# Patient Record
Sex: MALE | Race: WHITE | NOT HISPANIC OR LATINO | Employment: FULL TIME | URBAN - METROPOLITAN AREA
[De-identification: names, ages, dates, MRNs, and addresses within clinical notes are randomized per-mention and may not be internally consistent; named-entity substitution may affect disease eponyms.]

---

## 2017-10-10 ENCOUNTER — HOSPITAL ENCOUNTER (OUTPATIENT)
Dept: LAB | Facility: MEDICAL CENTER | Age: 62
End: 2017-10-10
Attending: UROLOGY
Payer: COMMERCIAL

## 2017-10-10 LAB — PSA SERPL-MCNC: 4.85 NG/ML (ref 0–4)

## 2017-10-10 PROCEDURE — 84153 ASSAY OF PSA TOTAL: CPT

## 2017-10-10 PROCEDURE — 36415 COLL VENOUS BLD VENIPUNCTURE: CPT

## 2018-10-15 ENCOUNTER — HOSPITAL ENCOUNTER (OUTPATIENT)
Dept: LAB | Facility: MEDICAL CENTER | Age: 63
End: 2018-10-15
Attending: UROLOGY
Payer: COMMERCIAL

## 2018-10-15 PROCEDURE — 36415 COLL VENOUS BLD VENIPUNCTURE: CPT

## 2018-10-15 PROCEDURE — 84153 ASSAY OF PSA TOTAL: CPT

## 2018-10-16 LAB — PSA SERPL-MCNC: 5.48 NG/ML (ref 0–4)

## 2018-11-12 ENCOUNTER — OFFICE VISIT (OUTPATIENT)
Dept: MEDICAL GROUP | Facility: MEDICAL CENTER | Age: 63
End: 2018-11-12
Payer: COMMERCIAL

## 2018-11-12 VITALS
TEMPERATURE: 96.1 F | WEIGHT: 173.72 LBS | HEART RATE: 67 BPM | OXYGEN SATURATION: 97 % | DIASTOLIC BLOOD PRESSURE: 68 MMHG | SYSTOLIC BLOOD PRESSURE: 102 MMHG | BODY MASS INDEX: 24.87 KG/M2 | HEIGHT: 70 IN

## 2018-11-12 DIAGNOSIS — M25.532 LEFT WRIST PAIN: ICD-10-CM

## 2018-11-12 DIAGNOSIS — E78.5 DYSLIPIDEMIA: ICD-10-CM

## 2018-11-12 DIAGNOSIS — M25.512 ACUTE PAIN OF LEFT SHOULDER: ICD-10-CM

## 2018-11-12 DIAGNOSIS — Z23 NEED FOR VACCINATION: ICD-10-CM

## 2018-11-12 DIAGNOSIS — L57.0 AK (ACTINIC KERATOSIS): ICD-10-CM

## 2018-11-12 PROCEDURE — 90715 TDAP VACCINE 7 YRS/> IM: CPT | Performed by: INTERNAL MEDICINE

## 2018-11-12 PROCEDURE — 90471 IMMUNIZATION ADMIN: CPT | Performed by: INTERNAL MEDICINE

## 2018-11-12 PROCEDURE — 99214 OFFICE O/P EST MOD 30 MIN: CPT | Mod: 25 | Performed by: INTERNAL MEDICINE

## 2018-11-12 PROCEDURE — 90472 IMMUNIZATION ADMIN EACH ADD: CPT | Performed by: INTERNAL MEDICINE

## 2018-11-12 PROCEDURE — 17000 DESTRUCT PREMALG LESION: CPT | Performed by: INTERNAL MEDICINE

## 2018-11-12 PROCEDURE — 90686 IIV4 VACC NO PRSV 0.5 ML IM: CPT | Performed by: INTERNAL MEDICINE

## 2018-11-12 RX ORDER — IBUPROFEN 600 MG/1
600 TABLET ORAL EVERY 8 HOURS PRN
Qty: 30 TAB | Refills: 0 | Status: SHIPPED | OUTPATIENT
Start: 2018-11-12 | End: 2018-12-12

## 2018-11-12 ASSESSMENT — PATIENT HEALTH QUESTIONNAIRE - PHQ9
SUM OF ALL RESPONSES TO PHQ QUESTIONS 1-9: 7
CLINICAL INTERPRETATION OF PHQ2 SCORE: 1
5. POOR APPETITE OR OVEREATING: 0 - NOT AT ALL

## 2018-11-12 NOTE — PROGRESS NOTES
"CC: Joint complaints, dyslipidemia, skin lesion.    HPI:   Felipe presents today with the following.    1. Acute pain of left shoulder  Presents complaining of left shoulder pain present for approximately 1 week.  Began to to be painful after beginning to lift weights.  Pain is 8 out of 10 intensity at rest.  He is taken 1 dose of ibuprofen with only minimally helpful.  Pain is with elevation.  He has rested for a week but it came back.  He is doing straight arm raises with weights.    2. Left wrist pain  Is also complaining of left wrist pain on the dorsum of his left hand.  3 out of 10 in intensity.  Worse with  strength and he has been trying to exercise more to help it.  No deformity no swelling.    3. Dyslipidemia  History of dyslipidemia well overdue for recheck of cholesterol is been almost 3 years.    4. AK (actinic keratosis)  Skin lesion on his right dorsum of his hand been present for several months it does continually scab which he picks off.    5. Need for vaccination        Patient Active Problem List    Diagnosis Date Noted   • Dyslipidemia 03/16/2010   • BPH (benign prostatic hypertrophy) 06/10/2009       Current Outpatient Prescriptions   Medication Sig Dispense Refill   • ibuprofen (MOTRIN) 600 MG Tab Take 1 Tab by mouth every 8 hours as needed. 30 Tab 0     No current facility-administered medications for this visit.          Allergies as of 11/12/2018 - Reviewed 11/12/2018   Allergen Reaction Noted   • Nkda [no known drug allergy]  08/10/2011        ROS: Denies Chest pain, SOB, LE edema.    /68   Pulse 67   Temp (!) 35.6 °C (96.1 °F)   Ht 1.778 m (5' 10\")   Wt 78.8 kg (173 lb 11.6 oz)   SpO2 97%   BMI 24.93 kg/m²     Physical Exam:  Gen:         Alert and oriented, No apparent distress.  Neck:        No Lymphadenopathy or Bruits.  Lungs:     Clear to auscultation bilaterally  CV:          Regular rate and rhythm. No murmurs, rubs or gallops.               Ext:          No " clubbing, cyanosis, edema.      Assessment and Plan.   63 y.o. male with the following issues.    1. Acute pain of left shoulder  Shoulder exam fairly benign with some mild pain with internal rotation.  Recommending rest ice given an anti-inflammatory and if not improving physical therapy.  - REFERRAL TO PHYSICAL THERAPY Reason for Therapy: Eval/Treat/Report    2. Left wrist pain  Again rest ice and anti-inflammatory if not improving orthopedics.  - ibuprofen (MOTRIN) 600 MG Tab; Take 1 Tab by mouth every 8 hours as needed.  Dispense: 30 Tab; Refill: 0    3. Dyslipidemia  Recheck cholesterol  - COMP METABOLIC PANEL; Future  - Lipid Profile; Future    4. AK (actinic keratosis)  Lesion on the dorsum of her right hand consistent with actinic keratosis frozen with liquid nitrogen today.  Post care instructions given    5. Need for vaccination    - Influenza Vaccine Quad Injection >3Y (PF)  - TDAP VACCINE =>8YO IM

## 2018-11-21 ENCOUNTER — HOSPITAL ENCOUNTER (OUTPATIENT)
Dept: LAB | Facility: MEDICAL CENTER | Age: 63
End: 2018-11-21
Attending: INTERNAL MEDICINE
Payer: COMMERCIAL

## 2018-11-21 DIAGNOSIS — E78.5 DYSLIPIDEMIA: ICD-10-CM

## 2018-11-21 LAB
ALBUMIN SERPL BCP-MCNC: 4.4 G/DL (ref 3.2–4.9)
ALBUMIN/GLOB SERPL: 1.4 G/DL
ALP SERPL-CCNC: 43 U/L (ref 30–99)
ALT SERPL-CCNC: 32 U/L (ref 2–50)
ANION GAP SERPL CALC-SCNC: 7 MMOL/L (ref 0–11.9)
AST SERPL-CCNC: 21 U/L (ref 12–45)
BILIRUB SERPL-MCNC: 0.6 MG/DL (ref 0.1–1.5)
BUN SERPL-MCNC: 27 MG/DL (ref 8–22)
CALCIUM SERPL-MCNC: 9.8 MG/DL (ref 8.5–10.5)
CHLORIDE SERPL-SCNC: 105 MMOL/L (ref 96–112)
CHOLEST SERPL-MCNC: 219 MG/DL (ref 100–199)
CO2 SERPL-SCNC: 28 MMOL/L (ref 20–33)
CREAT SERPL-MCNC: 0.93 MG/DL (ref 0.5–1.4)
FASTING STATUS PATIENT QL REPORTED: NORMAL
GLOBULIN SER CALC-MCNC: 3.1 G/DL (ref 1.9–3.5)
GLUCOSE SERPL-MCNC: 90 MG/DL (ref 65–99)
HDLC SERPL-MCNC: 37 MG/DL
LDLC SERPL CALC-MCNC: 130 MG/DL
POTASSIUM SERPL-SCNC: 4.5 MMOL/L (ref 3.6–5.5)
PROT SERPL-MCNC: 7.5 G/DL (ref 6–8.2)
SODIUM SERPL-SCNC: 140 MMOL/L (ref 135–145)
TRIGL SERPL-MCNC: 262 MG/DL (ref 0–149)

## 2018-11-21 PROCEDURE — 36415 COLL VENOUS BLD VENIPUNCTURE: CPT

## 2018-11-21 PROCEDURE — 80061 LIPID PANEL: CPT

## 2018-11-21 PROCEDURE — 80053 COMPREHEN METABOLIC PANEL: CPT

## 2018-12-12 ENCOUNTER — OFFICE VISIT (OUTPATIENT)
Dept: MEDICAL GROUP | Facility: MEDICAL CENTER | Age: 63
End: 2018-12-12
Payer: COMMERCIAL

## 2018-12-12 VITALS
HEART RATE: 95 BPM | HEIGHT: 70 IN | DIASTOLIC BLOOD PRESSURE: 64 MMHG | TEMPERATURE: 98 F | SYSTOLIC BLOOD PRESSURE: 110 MMHG | WEIGHT: 177 LBS | BODY MASS INDEX: 25.34 KG/M2 | OXYGEN SATURATION: 96 %

## 2018-12-12 DIAGNOSIS — R05.9 COUGH: ICD-10-CM

## 2018-12-12 DIAGNOSIS — E78.5 DYSLIPIDEMIA: ICD-10-CM

## 2018-12-12 PROCEDURE — 99214 OFFICE O/P EST MOD 30 MIN: CPT | Performed by: INTERNAL MEDICINE

## 2018-12-12 RX ORDER — ATORVASTATIN CALCIUM 20 MG/1
20 TABLET, FILM COATED ORAL DAILY
Qty: 90 TAB | Refills: 3 | Status: SHIPPED | OUTPATIENT
Start: 2018-12-12 | End: 2020-03-12

## 2018-12-12 RX ORDER — ALBUTEROL SULFATE 90 UG/1
2 AEROSOL, METERED RESPIRATORY (INHALATION) EVERY 6 HOURS PRN
Qty: 8.5 G | Refills: 6 | Status: SHIPPED | OUTPATIENT
Start: 2018-12-12 | End: 2022-03-09

## 2018-12-13 NOTE — PROGRESS NOTES
"CC: Follow-up cholesterol, cough.    HPI:   Felipe presents today with the following.    1. Dyslipidemia  Presents after having blood work showing cholesterol significantly elevated with a low HDL.  He does have a significant family history of heart disease and calculated to have 11% risk of heart attack in the next 10 years.  He is amenable to trying medication already exercises and follows a good diet.    2. Cough  He is complaining of persistent cough for 2 months slightly intermittently productive of yellow sputum.  No fevers or chills no other infectious symptoms.  Discussing he reports he had asthma as a child.  Denies any current shortness of breath      Patient Active Problem List    Diagnosis Date Noted   • Dyslipidemia 03/16/2010   • BPH (benign prostatic hypertrophy) 06/10/2009       Current Outpatient Prescriptions   Medication Sig Dispense Refill   • atorvastatin (LIPITOR) 20 MG Tab Take 1 Tab by mouth every day. 90 Tab 3   • albuterol 108 (90 Base) MCG/ACT Aero Soln inhalation aerosol Inhale 2 Puffs by mouth every 6 hours as needed for Shortness of Breath. 8.5 g 6     No current facility-administered medications for this visit.          Allergies as of 12/12/2018 - Reviewed 12/12/2018   Allergen Reaction Noted   • Nkda [no known drug allergy]  08/10/2011        ROS: Denies Chest pain, SOB, LE edema.    /64 (BP Location: Left arm, Patient Position: Sitting)   Pulse 95   Temp 36.7 °C (98 °F)   Ht 1.778 m (5' 10\")   Wt 80.3 kg (177 lb)   SpO2 96%   BMI 25.40 kg/m²     Physical Exam:  Gen:         Alert and oriented, No apparent distress.  Neck:        No Lymphadenopathy or Bruits.  Lungs:     Clear to auscultation bilaterally  CV:          Regular rate and rhythm. No murmurs, rubs or gallops.               Ext:          No clubbing, cyanosis, edema.      Assessment and Plan.   63 y.o. male with the following issues.    1. Dyslipidemia  Discussion about risk he is willing to try statin recheck " blood work 2 months.  - COMP METABOLIC PANEL; Future  - Lipid Profile; Future  - atorvastatin (LIPITOR) 20 MG Tab; Take 1 Tab by mouth every day.  Dispense: 90 Tab; Refill: 3    2. Cough  Have given an albuterol inhaler to see if he helps discussed pulmonary function test which he declines.

## 2019-01-10 DIAGNOSIS — M25.532 LEFT WRIST PAIN: ICD-10-CM

## 2019-01-10 RX ORDER — IBUPROFEN 600 MG/1
TABLET ORAL
Qty: 30 TAB | Refills: 0 | Status: SHIPPED | OUTPATIENT
Start: 2019-01-10 | End: 2019-01-15 | Stop reason: SDUPTHER

## 2019-01-11 ENCOUNTER — OFFICE VISIT (OUTPATIENT)
Dept: MEDICAL GROUP | Facility: MEDICAL CENTER | Age: 64
End: 2019-01-11
Payer: COMMERCIAL

## 2019-01-11 VITALS
HEIGHT: 70 IN | OXYGEN SATURATION: 95 % | DIASTOLIC BLOOD PRESSURE: 78 MMHG | SYSTOLIC BLOOD PRESSURE: 120 MMHG | BODY MASS INDEX: 25.34 KG/M2 | TEMPERATURE: 98.4 F | HEART RATE: 77 BPM | WEIGHT: 177 LBS

## 2019-01-11 DIAGNOSIS — M25.512 CHRONIC LEFT SHOULDER PAIN: ICD-10-CM

## 2019-01-11 DIAGNOSIS — G89.29 CHRONIC LEFT SHOULDER PAIN: ICD-10-CM

## 2019-01-11 PROCEDURE — 99213 OFFICE O/P EST LOW 20 MIN: CPT | Performed by: INTERNAL MEDICINE

## 2019-01-11 NOTE — LETTER
January 11, 2019        Felipe Hutson        Above would benefit from  animal.                       Bishop Pham

## 2019-01-11 NOTE — LETTER
January 11, 2019     Felipe Hutson  [address] [place] [address]      Dear Felipe:    Thank you for enrolling in [employer]. Please follow the instructions below to securely access your online medical record. [employer] allows you to send messages to your healthcare team, view certain test results, renew your prescriptions, schedule appointments, and more.     How Do I Sign Up?  1. In your Internet browser, go to  [email or web address]  2. Click on the Enter Code link in the Sign In box. You will see the New Member Sign Up page.  3. Enter your [facility] Access Code exactly as it appears below (case sensitive). You will not need to use this code after you’ve completed the sign-up process. If you do not sign up before the expiration date, you must request a new code.  [facility] Access Code: [email or web address]  Expires: 1/28/2019  5:13 AM    4. Enter your Email address and Date of Birth (mm/dd/yyyy) as indicated and click Submit. You will be taken to the next sign-up page.  5. Create a [facility] ID (case sensitive) . This will be your [facility] [employer] login ID and cannot be changed, so think of one that is secure and easy to remember.  6. Create a [facility] [employer] password  (case sensitive).   · Your password must be a length of at least 6 characters/digits.  · It must include at least 1 numeric.  · You can change your password at any time.  7. Enter your Password Reset Question and Answer. This can be used at a later time if you forget your password.   8. Enter your e-mail address. You will receive e-mail notification when new information is available in [employer].  9. Click Sign Up. You can now view your medical record.     Additional Information  Please contact [employer] Customer Support at [phone] for any questions . Remember, [employer] is NOT to be used for urgent needs. For medical emergencies, dial 911.          Introducing [employer]    [facility]’s Secure, Online Health Connection      [facility] now offers  convenient, online access to your healthcare team and personal health information.  [employer] makes managing your healthcare easier than ever, and allows you to:  • Email your healthcare provider securely and privately  • Access your test results  • Request prescription refills 24 hours a day  • View your personal medical records from home  • Schedule or change your appointments  • View your Insurance and Billing Information  • Pay bills online ? Coming soon!        Sign below to get started:  I hereby request access to [employer] application.  I agree to abide by the [employer] Terms and Conditions, which will be provided to me upon activating my account.       __________________________________        _________________________  Name (Please Print)          Date of Birth     __________________________________       _________________________  Signature          Primary Care Provider      _______________  Date                          *For Internal Use Only: Please scan this form into the patient’s chart. Click on  - Select Patient - Attach to Encounter:  - Document Type: Consent   - Document Description: MyChart Consent

## 2019-01-11 NOTE — PROGRESS NOTES
"CC: Follow-up shoulder pain.    HPI:   Felipe presents today with the following.    1. Chronic left shoulder pain  Presents after being referred to physical therapy did not get a phone call from physical therapist.  Just called the other day and has been now connected and is scheduled for 4 visits.  Still having pain with lifting over his head requesting refills of ibuprofen.  Denies any further injury since last visit.  Pain is 5 out of 10 intensity with raising over his head.  Denies any abdominal complaints from ibuprofen.      Patient Active Problem List    Diagnosis Date Noted   • Dyslipidemia 03/16/2010   • BPH (benign prostatic hypertrophy) 06/10/2009       Current Outpatient Prescriptions   Medication Sig Dispense Refill   •  MG Tab TAKE 1 TABLET BY MOUTH EVERY 8 HOURS AS NEEDED 30 Tab 0   • atorvastatin (LIPITOR) 20 MG Tab Take 1 Tab by mouth every day. 90 Tab 3   • albuterol 108 (90 Base) MCG/ACT Aero Soln inhalation aerosol Inhale 2 Puffs by mouth every 6 hours as needed for Shortness of Breath. 8.5 g 6     No current facility-administered medications for this visit.          Allergies as of 01/11/2019 - Reviewed 01/11/2019   Allergen Reaction Noted   • Nkda [no known drug allergy]  08/10/2011        ROS: Denies Chest pain, SOB, LE edema.    /78 (BP Location: Right arm, Patient Position: Sitting)   Pulse 77   Temp 36.9 °C (98.4 °F)   Ht 1.778 m (5' 10\")   Wt 80.3 kg (177 lb)   SpO2 95%   BMI 25.40 kg/m²     Physical Exam:  Gen:         Alert and oriented, No apparent distress.  Neck:        No Lymphadenopathy or Bruits.  Lungs:     Clear to auscultation bilaterally  CV:          Regular rate and rhythm. No murmurs, rubs or gallops.               Ext:          No clubbing, cyanosis, edema.      Assessment and Plan.   63 y.o. male with the following issues.    1. Chronic left shoulder pain  Continue with plan as discussed previously physical therapy if not improving will get imaging and " refer to sports medicine.

## 2019-01-15 DIAGNOSIS — M25.532 LEFT WRIST PAIN: ICD-10-CM

## 2019-01-15 RX ORDER — IBUPROFEN 600 MG/1
600 TABLET ORAL EVERY 8 HOURS PRN
Qty: 30 TAB | Refills: 0 | Status: SHIPPED | OUTPATIENT
Start: 2019-01-15 | End: 2022-03-09

## 2019-01-21 ENCOUNTER — PHYSICAL THERAPY (OUTPATIENT)
Dept: PHYSICAL THERAPY | Facility: MEDICAL CENTER | Age: 64
End: 2019-01-21
Attending: INTERNAL MEDICINE
Payer: COMMERCIAL

## 2019-01-21 DIAGNOSIS — M25.512 LEFT SHOULDER PAIN, UNSPECIFIED CHRONICITY: ICD-10-CM

## 2019-01-21 PROCEDURE — 97110 THERAPEUTIC EXERCISES: CPT

## 2019-01-21 PROCEDURE — 97161 PT EVAL LOW COMPLEX 20 MIN: CPT

## 2019-01-21 PROCEDURE — 97014 ELECTRIC STIMULATION THERAPY: CPT

## 2019-01-21 SDOH — ECONOMIC STABILITY: GENERAL: QUALITY OF LIFE: EXCELLENT

## 2019-01-21 ASSESSMENT — ENCOUNTER SYMPTOMS
PAIN SCALE AT HIGHEST: 5
ALLEVIATING FACTORS: ACTIVITY
PAIN TIMING: CONTINUOUSLY
PAIN SCALE AT LOWEST: 2
EXACERBATED BY: OVERHEAD ACTIVITY
EXACERBATED BY: CARRYING
PAIN SCALE: 2
QUALITY: SHARP

## 2019-01-21 NOTE — OP THERAPY EVALUATION
Outpatient Physical Therapy  INITIAL EVALUATION    [facility] Outpatient Physical Therapy  [address]  [place] [address]  Phone:  [phone]  Fax:  [phone]    Date of Evaluation: 2019    Patient: Felipe Hutson  YOB: 1955  MRN: [id number]     Referring Provider: No referring provider defined for this encounter.   Referring Diagnosis Pain in left shoulder [M25.512]     Time Calculation  Start time: 1430  Stop time: 1530 Time Calculation (min): 60 minutes     Physical Therapy Occurrence Codes    Date of onset of impairment:  18   Date physical therapy care plan established or reviewed:  19   Date physical therapy treatment started:  19          Chief Complaint: No chief complaint on file.    Visit Diagnoses     ICD-10-CM   1. Left shoulder pain, unspecified chronicity M25.512         Subjective:   History of Present Illness:     Mechanism of injury:  The patient reports onset of L lateral shoulder pain when in the gym 2 months ago following an exercise of  abducting arms with #20 free weights in hands, palms down. The pain began shortly after this activity. He stopped doing his gym program but the pain has worsened since then. Has numbness/tingling on volar side L hand only. Denies neck pain. Denies radiating pain below elbow. Symptoms are currently in L anterior shoulder and in proximal, lateral L arm.   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, at baseline was doing more activities at the gym with [employer]  Headaches:  no headaches  Sleep disturbance:  Interrupted sleep, non-restful sleep and difficulty falling asleep (usually sleeps on that side, so he keeps waking up on the L side with pain)  Pain:     Current pain ratin    At best pain ratin    At worst pain ratin    Location:  Anterior L shoulder, proximal lateral L arm    Quality:  Sharp (feels like someone  took a nail and jammed it in the shoulder)    Pain timing:  Continuously    Relieving factors:  Activity    Aggravating factors:  Carrying and overhead activity    Progression:  Worsening    Pain Comments::  Doesn't notice the pain as much when working. No relief of pain on weekends or non-work days.   Social Support:     Patient lives at: in the process of moving, just packing at this time so moving for Feb 1.  Hand dominance:  Left  Diagnostic Tests:     None    Treatments:     None    Activities of Daily Living:     Patient reported ADL status: Independent, no problems but painful when reaching L arm back to put jacket sleeve on.   Patient Goals:     Patient goals for therapy:  Return to sport/leisure activities and independence with ADLs/IADLs    Other patient goals:  To get back to the gym and to sleep without shoulder pain disrupting      Past Medical History:   Diagnosis Date   • BPH 6/10/2009     Past Surgical History:   Procedure Laterality Date   • HYDROCELECTOMY ADULT       Social History   Substance Use Topics   • Smoking status: Never Smoker   • Smokeless tobacco: Never Used   • Alcohol use No     Family and Occupational History     Social History   • Marital status:      Spouse name: N/A   • Number of children: N/A   • Years of education: N/A       Objective     Cervical Screen    Cervical range of motion within normal limits    Active Range of Motion   Left Shoulder   Normal active range of motion  Flexion: with pain  Abduction: with pain    Right Shoulder   Normal active range of motion    Additional Active Range of Motion Details  Pain at end range >throughout motion    Passive Range of Motion   Left Shoulder   Flexion: WFL  Extension: WFL  Abduction: WFL  Adduction: WFL  External rotation 0°: WFL  External rotation 45°: WFL  External rotation 90°: 70 degrees with pain  Internal rotation 0°: WFL  Internal rotation 45°: WFL  Internal rotation 90°: WFL  Horizontal abduction: WFL  Horizontal  adduction: WFL    Right Shoulder   Normal passive range of motion    Joint Play   Left Shoulder     Anterior capsule: within functional limits    Posterior capsule: hypomobile    Inferior capsule: within functional limits    Additional joint play details:  Reduced symptom provocation end range ER at 90 deg with posterior glide GH    Additional Joint Play Details  Reduced symptom provocation end range ER at 90 deg with posterior glide GH    Tests     Left Shoulder   Positive belly press, lift-off and Neer's.   Negative drop arm, empty can, Hawkin's and Yergason's.         Therapeutic Exercises (CPT 76507):       Therapeutic Exercise Summary: Access Code: [email or web address]   URL: [email or web address]   Date: 01/21/2019   Prepared by: Karlene Melvin      Exercises  · Doorway Pec Stretch at 90 Degrees Abduction - 2 reps - 1 sets - 30 sec. hold - 5x daily - 7x weekly  · Supine Chest Stretch with Elbows Bent - 10 reps - 2 sets - 1x daily - 7x weekly  · Standing Isometric Shoulder Internal Rotation at Doorway - 10 reps - 2 sets - 5 sec. hold - 1x daily - 7x weekly  · Isometric Shoulder Internal Rotation - 10 reps - 2 sets - 30 sec. hold - 1x daily - 7x weekly          Time-based treatments/modalities:  Therapeutic exercise minutes (CPT 79101): 8 minutes       Assessment, Response and Plan:   Impairments: abnormal or restricted ROM, difficulty performing job, lacks appropriate home exercise program and pain with function    Other Impairments:  Posture  Assessment details:  63 year old male with 2 month history of L shoulder pain with onset after beginning a new weight lifting routine at the gym. Exam findings are consistent with impingement syndrome. The patient will benefit from skilled PT to address associated impairments and limitations.  Barriers to therapy:  None  Prognosis: good    Goals:   Short Term Goals:   L shoulder AROM without provocation of symptoms  Patient is able to don/doff coat without provocation of  symptoms    Short term goal time span:  2-4 weeks  Long term goal time span:  6-8 weeks  Patient progress towards Long Term goals:  QuickDASH score improved >/= MCID  ADLs/IADLs without provocation of L shoulder pain  Return to participation in gym program    Plan:   Therapy options:  Physical therapy treatment to continue  Planned therapy interventions:  E Stim Unattended (CPT 69693), Manual Therapy (CPT 81502), Neuromuscular Re-education (CPT 94626), Therapeutic Exercise (CPT 46530) and Therapeutic Activities (CPT 22104)  Frequency:  2x week  Duration in weeks:  8  Discussed with:  Patient      Functional Limitations  Quickdash General Total Score: 52.27     Referring provider co-signature:  I have reviewed this plan of care and my co-signature certifies the need for services.  Certification Dates:   From 1/21/2019   To 3/22/2019    Physician Signature: ________________________________ Date: ______________

## 2019-01-28 ENCOUNTER — PHYSICAL THERAPY (OUTPATIENT)
Dept: PHYSICAL THERAPY | Facility: MEDICAL CENTER | Age: 64
End: 2019-01-28
Attending: INTERNAL MEDICINE
Payer: COMMERCIAL

## 2019-01-28 DIAGNOSIS — M25.512 LEFT SHOULDER PAIN, UNSPECIFIED CHRONICITY: ICD-10-CM

## 2019-01-28 PROCEDURE — 97140 MANUAL THERAPY 1/> REGIONS: CPT

## 2019-01-28 PROCEDURE — 97110 THERAPEUTIC EXERCISES: CPT

## 2019-01-28 NOTE — OP THERAPY DAILY TREATMENT
Outpatient Physical Therapy  DAILY TREATMENT     [facility] Outpatient Physical Therapy  [address]  [place] [address]  Phone:  [phone]  Fax:  [phone]    Date: 01/28/2019    Patient: Felipe Hutson  YOB: 1955  MRN: [id number]     Time Calculation  Start time: 1515  Stop time: 1545 Time Calculation (min): 30 minutes     Chief Complaint: Shoulder Problem    Visit #: 2    SUBJECTIVE:  Felt better for a few days after first session.     OBJECTIVE:          Therapeutic Exercises (CPT 58973):     1. Supine chest stretch (from [facility]), 10x    2. Sidelying ER , 0# 10x    3. Wallslide , 10x    4. Shoulder ext, Orange 10x    5. Row, Green 10x    6. IR shoulder isometric at doorway, 10x2    7. Standing scap retract, 10x    Therapeutic Treatments and Modalities:     1. Manual Therapy (CPT 24413), Gr. II-III posterior GHJ mobs, also in 90/90 position. PROM to R shoulder     Time-based treatments/modalities:  Manual therapy minutes (CPT 44312): 15 minutes  Therapeutic exercise minutes (CPT 13100): 15 minutes       Pain rating before treatment: 4  Pain rating after treatment: 3    ASSESSMENT:   Response to treatment: Pt with compensatory L shoulder elevation with wall slide and resistance exercises, but he is able to correct with verbal cues. Pt reported most decrease in pain with posterior GHJ mobs.    PLAN/RECOMMENDATIONS:   Plan for treatment: therapy treatment to continue next visit.  Planned interventions for next visit: continue with current treatment.

## 2019-01-30 ENCOUNTER — PHYSICAL THERAPY (OUTPATIENT)
Dept: PHYSICAL THERAPY | Facility: MEDICAL CENTER | Age: 64
End: 2019-01-30
Attending: INTERNAL MEDICINE
Payer: COMMERCIAL

## 2019-01-30 DIAGNOSIS — M25.512 LEFT SHOULDER PAIN, UNSPECIFIED CHRONICITY: ICD-10-CM

## 2019-01-30 PROCEDURE — 97110 THERAPEUTIC EXERCISES: CPT

## 2019-01-30 PROCEDURE — 97140 MANUAL THERAPY 1/> REGIONS: CPT

## 2019-01-30 NOTE — OP THERAPY DAILY TREATMENT
Outpatient Physical Therapy  DAILY TREATMENT     [facility] Outpatient Physical Therapy  [address]  [place] [address]  Phone:  [phone]  Fax:  [phone]    Date: 01/30/2019    Patient: Felipe Hutson  YOB: 1955  MRN: [id number]     Time Calculation  Start time: 1530  Stop time: 1600 Time Calculation (min): 30 minutes     Chief Complaint: Shoulder Problem    Visit #: 3    SUBJECTIVE:  Woke up with L bill-superior shoulder pain this morning. Thinks this was caused by sleeping on his L side.  No pain in pec region currently. No pain when doing the exercises.     OBJECTIVE:  Current objective measures:      Resisted horizontal adduction L = pain free  Supraspinatus tendon L TTP       Therapeutic Exercises (CPT 34341):     1. Supine chest stretch on pillows, 1 min x 2, HEP    2. Standing pec stretch, 30 sec. x 4, HEP    3. Scapular adduction/depression, 10 with 5 sec. hold x 2, HEP    4. Bilateral shoulder ER with orange band, 10 x 2, HEP    Therapeutic Treatments and Modalities:     1. Manual Therapy (CPT 21274), Gr. III-IV posterior GHJ mobs, also in 90/90 position. PROM to R shoulder     Time-based treatments/modalities:  Manual therapy minutes (CPT 05023): 10 minutes  Therapeutic exercise minutes (CPT 63329): 20 minutes       ASSESSMENT:   Response to treatment: Reduction in baseline symptoms with GH stretch and today's exercises. Will continue with these as HEP. Handout provided.     PLAN/RECOMMENDATIONS:   Plan for treatment: therapy treatment to continue next visit.  Planned interventions for next visit: continue with current treatment.

## 2019-02-04 ENCOUNTER — PHYSICAL THERAPY (OUTPATIENT)
Dept: PHYSICAL THERAPY | Facility: MEDICAL CENTER | Age: 64
End: 2019-02-04
Attending: INTERNAL MEDICINE
Payer: COMMERCIAL

## 2019-02-04 DIAGNOSIS — M25.512 LEFT SHOULDER PAIN, UNSPECIFIED CHRONICITY: ICD-10-CM

## 2019-02-04 PROCEDURE — 97140 MANUAL THERAPY 1/> REGIONS: CPT

## 2019-02-04 PROCEDURE — 97110 THERAPEUTIC EXERCISES: CPT

## 2019-02-05 NOTE — OP THERAPY DAILY TREATMENT
Outpatient Physical Therapy  DAILY TREATMENT     [facility] Outpatient Physical Therapy  [address]  [place] [address]  Phone:  [phone]  Fax:  [phone]    Date: 02/04/2019    Patient: Felipe Hutson  YOB: 1955  MRN: [id number]     Time Calculation  Start time: 0400  Stop time: 0430 Time Calculation (min): 30 minutes     Chief Complaint: No chief complaint on file.    Visit #: 4    SUBJECTIVE:  Its feeling better pain mainly at night if lies on L  side    OBJECTIVE:  Current objective measures:         Exercises/Treatment  Time-based treatments/modalities:   Joint mobs flexion, int rot    P/A thorasic 1-12  STM infraspinatus ,teres  thorasic stretches on roll  Plank and weight bearing aginst wall      ASSESSMENT:   Response to treatment:   No pain following treatment  PLAN/RECOMMENDATIONS:   Plan for treatment: therapy treatment to continue next visit.  Planned interventions for next visit: continue with current treatment.

## 2019-02-06 ENCOUNTER — PHYSICAL THERAPY (OUTPATIENT)
Dept: PHYSICAL THERAPY | Facility: MEDICAL CENTER | Age: 64
End: 2019-02-06
Attending: INTERNAL MEDICINE
Payer: COMMERCIAL

## 2019-02-06 DIAGNOSIS — M25.512 LEFT SHOULDER PAIN, UNSPECIFIED CHRONICITY: ICD-10-CM

## 2019-02-06 PROCEDURE — 97140 MANUAL THERAPY 1/> REGIONS: CPT

## 2019-02-06 PROCEDURE — 97110 THERAPEUTIC EXERCISES: CPT

## 2019-02-06 PROCEDURE — 97014 ELECTRIC STIMULATION THERAPY: CPT

## 2019-02-07 NOTE — OP THERAPY DAILY TREATMENT
Outpatient Physical Therapy  DAILY TREATMENT     [facility] Outpatient Physical Therapy  [address]  [place] [address]  Phone:  [phone]  Fax:  [phone]    Date: 02/06/2019    Patient: Felipe Hutson  YOB: 1955  MRN: [id number]     Time Calculation  Start time: 0415  Stop time: 0502 Time Calculation (min): 47 minutes     Chief Complaint: shoulder pain  Visit #: 5    SUBJECTIVE:  Pt states he's feeling better overall.     OBJECTIVE:      Therapeutic Exercises (CPT 03197):     1. Foam roller: alt flexion, pec stretch, s punch, 2 x 10, HEP    2. Horizontal stretching over roller, 30 sec. x 4, HEP    3. Scapular adduction/depression, 10 with 5 sec. hold x 2, HEP    4. Bilateral shoulder ER with orange band, 10 x 2, HEP    5. Mid rows    6. Horizontal abduction    Therapeutic Treatments and Modalities:     1. Manual Therapy (CPT 93961), Gr. III-IV posterior GHJ mobs, also in 90/90 position. PROM to R shoulder     2. E Stim Unattended (CPT 29030), Micronesian stim to left deltoid/infra 10/10 with MHP    Time-based treatments/modalities:  Manual therapy minutes (CPT 63383): 10 minutes  Therapeutic exercise minutes (CPT 55439): 16 minutes           ASSESSMENT:   Pt 's making nice improvement overall. He'll consider getting a roller for home use.     PLAN/RECOMMENDATIONS:   Plan for treatment: therapy treatment to continue next visit.  Planned interventions for next visit: manual therapy (CPT 65395) and therapeutic exercise (CPT 57920).

## 2019-02-11 ENCOUNTER — PHYSICAL THERAPY (OUTPATIENT)
Dept: PHYSICAL THERAPY | Facility: MEDICAL CENTER | Age: 64
End: 2019-02-11
Attending: INTERNAL MEDICINE
Payer: COMMERCIAL

## 2019-02-11 DIAGNOSIS — M25.512 LEFT SHOULDER PAIN, UNSPECIFIED CHRONICITY: ICD-10-CM

## 2019-02-11 PROCEDURE — 97014 ELECTRIC STIMULATION THERAPY: CPT

## 2019-02-11 NOTE — OP THERAPY DAILY TREATMENT
"  Outpatient Physical Therapy  DAILY TREATMENT     [facility] Outpatient Physical Therapy  [address]  [place] [address]  Phone:  [phone]  Fax:  [phone]    Date: 02/11/2019    Patient: Felipe Hutson  YOB: 1955  MRN: [id number]     Time Calculation  Start time: 1530  Stop time: 1620 Time Calculation (min): 50 minutes     Chief Complaint: shoulder pain    Visit #: 6    SUBJECTIVE:  Pt feels much better, no pain during the day just w/increased reaching out to the L. Doing exercises w/a ball at home, ordered a foam roller.    OBJECTIVE:    Therapeutic Exercises (CPT 12971):     1. Foam Roller, pec, alt UE OH and lat, angels    2. Prone over ball, ski jump, alt UE, ski jump (x1' ea)    3. Rows w/blue TB, x20    4. Long lats w/blue TB, x20    5. Hor abd/ER  w/blue TB, x20    6. HBB press off wall, 10x5\"    7. Scap slide in qped to kneel, x10    Therapeutic Treatments and Modalities:     1. E Stim Unattended (CPT 55372), Marshallese stim to left deltoid/infra 10/10 with MHP    Time-based treatments/modalities:  Therapeutic exercise minutes (CPT 75575): 30 minutes         ASSESSMENT:   Pt demos normalized ROM, only c/o pulling w/HBB. Difficult to obtain accurate subjective info to assess ability to do functional/recreational activities.    PLAN/RECOMMENDATIONS:   Plan for treatment: therapy treatment to continue next visit.  Planned interventions for next visit: manual therapy (CPT 61035) and therapeutic exercise (CPT 75568).      "

## 2019-02-13 ENCOUNTER — PHYSICAL THERAPY (OUTPATIENT)
Dept: PHYSICAL THERAPY | Facility: MEDICAL CENTER | Age: 64
End: 2019-02-13
Attending: INTERNAL MEDICINE
Payer: COMMERCIAL

## 2019-02-13 DIAGNOSIS — M25.512 LEFT SHOULDER PAIN, UNSPECIFIED CHRONICITY: ICD-10-CM

## 2019-02-13 PROCEDURE — 97140 MANUAL THERAPY 1/> REGIONS: CPT

## 2019-02-13 PROCEDURE — 97110 THERAPEUTIC EXERCISES: CPT

## 2019-02-14 NOTE — OP THERAPY DISCHARGE SUMMARY
Outpatient Physical Therapy  DISCHARGE SUMMARY NOTE      [facility] Outpatient Physical Therapy  [address]  [place] [address]  Phone:  [phone]  Fax:  [phone]    Date of Visit: 02/13/2019    Patient: Felipe Hutson  YOB: 1955  MRN: [id number]     Referring Provider: Bishop Pham M.D.  [address] [place] [address] [address]  [place] [address]   Referring Diagnosis Acute pain of left shoulder [M25.512]     Physical Therapy Occurrence Codes    Date of onset of impairment:  11/1/18   Date physical therapy care plan established or reviewed:  1/21/19   Date physical therapy treatment started:  1/21/19          Functional Limitations and Severity Modifiers      Goal:     Discharge:         Your patient is being discharged from Physical Therapy with the following comments:   · Goals met    Comments:  Felipe has made nice progress he complains of only occasional minimal pain,he is sleeping undisturbed  He has Full active ROM and muscle strength is good  He has a comprehensive HEP and should continue to do well    Limitations Remaining:  non    Recommendations:  Continue with HEP    Bishop Youssef, PT    Date: 2/13/2019   Walk in

## 2019-02-14 NOTE — OP THERAPY DAILY TREATMENT
Outpatient Physical Therapy  DAILY TREATMENT     [facility] Outpatient Physical Therapy  [address]  [place] [address]  Phone:  [phone]  Fax:  [phone]    Date: 02/13/2019    Patient: Felipe Hutson  YOB: 1955  MRN: [id number]     Time Calculation  Start time: 0400  Stop time: 0430 Time Calculation (min): 30 minutes     Chief Complaint: No chief complaint on file.    Visit #: 7    SUBJECTIVE:  Doing a lot better almost no pain      OBJECTIVE:  Current objective measures:     Full active ROM all directions    Exercises/Treatment  Time-based treatments/modalities:      Joint mobs L shoulder  STM L traps  Reviewed HEP   jim pose  doorway stretch        ASSESSMENT:   Response to treatment:   See D/C note    PLAN/RECOMMENDATIONS:   Plan for treatment: therapy treatment to continue next visit.  Planned interventions for next visit: continue with current treatment.

## 2019-02-15 ENCOUNTER — HOSPITAL ENCOUNTER (OUTPATIENT)
Dept: LAB | Facility: MEDICAL CENTER | Age: 64
End: 2019-02-15
Attending: INTERNAL MEDICINE
Payer: COMMERCIAL

## 2019-02-15 DIAGNOSIS — E78.5 DYSLIPIDEMIA: ICD-10-CM

## 2019-02-15 LAB
ALBUMIN SERPL BCP-MCNC: 4.8 G/DL (ref 3.2–4.9)
ALBUMIN/GLOB SERPL: 1.7 G/DL
ALP SERPL-CCNC: 45 U/L (ref 30–99)
ALT SERPL-CCNC: 31 U/L (ref 2–50)
ANION GAP SERPL CALC-SCNC: 5 MMOL/L (ref 0–11.9)
AST SERPL-CCNC: 21 U/L (ref 12–45)
BILIRUB SERPL-MCNC: 0.7 MG/DL (ref 0.1–1.5)
BUN SERPL-MCNC: 20 MG/DL (ref 8–22)
CALCIUM SERPL-MCNC: 10.3 MG/DL (ref 8.5–10.5)
CHLORIDE SERPL-SCNC: 103 MMOL/L (ref 96–112)
CHOLEST SERPL-MCNC: 134 MG/DL (ref 100–199)
CO2 SERPL-SCNC: 29 MMOL/L (ref 20–33)
CREAT SERPL-MCNC: 0.96 MG/DL (ref 0.5–1.4)
FASTING STATUS PATIENT QL REPORTED: NORMAL
GLOBULIN SER CALC-MCNC: 2.9 G/DL (ref 1.9–3.5)
GLUCOSE SERPL-MCNC: 88 MG/DL (ref 65–99)
HDLC SERPL-MCNC: 42 MG/DL
LDLC SERPL CALC-MCNC: 68 MG/DL
POTASSIUM SERPL-SCNC: 4.2 MMOL/L (ref 3.6–5.5)
PROT SERPL-MCNC: 7.7 G/DL (ref 6–8.2)
SODIUM SERPL-SCNC: 137 MMOL/L (ref 135–145)
TRIGL SERPL-MCNC: 118 MG/DL (ref 0–149)

## 2019-02-15 PROCEDURE — 80061 LIPID PANEL: CPT

## 2019-02-15 PROCEDURE — 36415 COLL VENOUS BLD VENIPUNCTURE: CPT

## 2019-02-15 PROCEDURE — 80053 COMPREHEN METABOLIC PANEL: CPT

## 2019-09-11 ENCOUNTER — HOSPITAL ENCOUNTER (OUTPATIENT)
Dept: LAB | Facility: MEDICAL CENTER | Age: 64
End: 2019-09-11
Attending: UROLOGY
Payer: COMMERCIAL

## 2019-09-11 LAB — PSA SERPL-MCNC: 5.89 NG/ML (ref 0–4)

## 2019-09-11 PROCEDURE — 36415 COLL VENOUS BLD VENIPUNCTURE: CPT

## 2019-09-11 PROCEDURE — 84153 ASSAY OF PSA TOTAL: CPT

## 2020-03-12 DIAGNOSIS — E78.5 DYSLIPIDEMIA: ICD-10-CM

## 2020-03-12 RX ORDER — ATORVASTATIN CALCIUM 20 MG/1
TABLET, FILM COATED ORAL
Qty: 90 TAB | Refills: 3 | Status: SHIPPED | OUTPATIENT
Start: 2020-03-12 | End: 2022-03-09 | Stop reason: SDUPTHER

## 2020-12-18 ENCOUNTER — HOSPITAL ENCOUNTER (OUTPATIENT)
Dept: LAB | Facility: MEDICAL CENTER | Age: 65
End: 2020-12-18
Attending: UROLOGY
Payer: COMMERCIAL

## 2020-12-18 PROCEDURE — 84153 ASSAY OF PSA TOTAL: CPT

## 2020-12-18 PROCEDURE — 84154 ASSAY OF PSA FREE: CPT

## 2020-12-18 PROCEDURE — 36415 COLL VENOUS BLD VENIPUNCTURE: CPT

## 2020-12-19 LAB
PSA FREE MFR SERPL: 24 %
PSA FREE SERPL-MCNC: 1.4 NG/ML
PSA SERPL-MCNC: 5.8 NG/ML (ref 0–4)

## 2021-03-03 DIAGNOSIS — Z23 NEED FOR VACCINATION: ICD-10-CM

## 2022-03-09 ENCOUNTER — OFFICE VISIT (OUTPATIENT)
Dept: MEDICAL GROUP | Facility: LAB | Age: 67
End: 2022-03-09
Payer: COMMERCIAL

## 2022-03-09 VITALS
DIASTOLIC BLOOD PRESSURE: 62 MMHG | SYSTOLIC BLOOD PRESSURE: 112 MMHG | BODY MASS INDEX: 26.4 KG/M2 | TEMPERATURE: 97.5 F | HEART RATE: 66 BPM | WEIGHT: 184.4 LBS | RESPIRATION RATE: 14 BRPM | OXYGEN SATURATION: 97 % | HEIGHT: 70 IN

## 2022-03-09 DIAGNOSIS — Z12.12 SCREENING FOR COLORECTAL CANCER: ICD-10-CM

## 2022-03-09 DIAGNOSIS — Z11.59 NEED FOR HEPATITIS C SCREENING TEST: ICD-10-CM

## 2022-03-09 DIAGNOSIS — N40.1 BENIGN PROSTATIC HYPERPLASIA WITH LOWER URINARY TRACT SYMPTOMS, SYMPTOM DETAILS UNSPECIFIED: Chronic | ICD-10-CM

## 2022-03-09 DIAGNOSIS — M54.6 LEFT-SIDED THORACIC BACK PAIN, UNSPECIFIED CHRONICITY: ICD-10-CM

## 2022-03-09 DIAGNOSIS — Z12.11 SCREENING FOR COLORECTAL CANCER: ICD-10-CM

## 2022-03-09 DIAGNOSIS — Z00.00 HEALTH MAINTENANCE EXAMINATION: ICD-10-CM

## 2022-03-09 DIAGNOSIS — Z23 NEED FOR VACCINATION: ICD-10-CM

## 2022-03-09 DIAGNOSIS — E78.5 DYSLIPIDEMIA: ICD-10-CM

## 2022-03-09 PROCEDURE — 90732 PPSV23 VACC 2 YRS+ SUBQ/IM: CPT | Performed by: NURSE PRACTITIONER

## 2022-03-09 PROCEDURE — 99214 OFFICE O/P EST MOD 30 MIN: CPT | Mod: 25 | Performed by: NURSE PRACTITIONER

## 2022-03-09 PROCEDURE — 90471 IMMUNIZATION ADMIN: CPT | Performed by: NURSE PRACTITIONER

## 2022-03-09 RX ORDER — ATORVASTATIN CALCIUM 20 MG/1
20 TABLET, FILM COATED ORAL DAILY
Qty: 90 TABLET | Refills: 3 | Status: SHIPPED | OUTPATIENT
Start: 2022-03-09 | End: 2023-02-07

## 2022-03-09 ASSESSMENT — PATIENT HEALTH QUESTIONNAIRE - PHQ9: CLINICAL INTERPRETATION OF PHQ2 SCORE: 0

## 2022-03-09 NOTE — LETTER
[employer]  MAURIZIO Clark  [address]  [place] [address]  Fax: [phone]   Authorization for Release/Disclosure of   Protected Health Information   Name: FELIPE CASTRO : 1955 SSN: [id number]   Address: [facility] [address]  [place] [address] Phone:    [phone] (home)    I authorize the entity listed below to release/disclose the PHI below to:   [employer] [facility]/MAURIZIO Clark and MAURIZIO Clark   Provider or Entity Name:  [facility]   Address   City, [facility], [place], [place] Phone:      Fax:     Reason for request: continuity of care   Information to be released:    [  ] LAST COLONOSCOPY,  including any PATH REPORT and follow-up  [  ] LAST FIT/COLOGUARD RESULT [  ] LAST DEXA  [  ] LAST MAMMOGRAM  [  ] LAST PAP  [  ] LAST LABS [  ] RETINA EXAM REPORT  [  ] IMMUNIZATION RECORDS  [xx  ] Release all info      [  ] Check here and initial the line next to each item to release ALL health information INCLUDING  _____ Care and treatment for drug and / or alcohol abuse  _____ HIV testing, infection status, or AIDS  _____ Genetic Testing    DATES OF SERVICE OR TIME PERIOD TO BE DISCLOSED: _____________  I understand and acknowledge that:  * This Authorization may be revoked at any time by you in writing, except if your health information has already been used or disclosed.  * Your health information that will be used or disclosed as a result of you signing this authorization could be re-disclosed by the recipient. If this occurs, your re-disclosed health information may no longer be protected by State or Federal laws.  * You may refuse to sign this Authorization. Your refusal will not affect your ability to obtain treatment.  * This Authorization becomes effective upon signing and will  on (date) __________.      If no date is indicated, this Authorization will  one (1) year from the signature date.    Name: Felipe Castro    Signature:   Date:      3/9/2022       PLEASE FAX REQUESTED RECORDS BACK TO: [phone]

## 2022-03-10 NOTE — PROGRESS NOTES
"Subjective:     CC:  Diagnoses of Left-sided thoracic back pain, unspecified chronicity, Benign prostatic hyperplasia with lower urinary tract symptoms, symptom details unspecified, Dyslipidemia, Health maintenance examination, Need for hepatitis C screening test, Need for vaccination, and Screening for colorectal cancer were pertinent to this visit.    HISTORY OF THE PRESENT ILLNESS: Patient is a 66 y.o. male. This pleasant patient is here today to establish care and discuss the following:    BPH (benign prostatic hypertrophy)  Followed by urology - [facility], has an appointment at the end of this month. He is currently taking Finasteride. No current symptoms.     Dyslipidemia  Doing well. Due for lab work. He was previously taking atorvastatin 20 mg, but has not had this refilled for several years. He has been managing with lifestyle modifications, but does have a strong family history of hyperlipidemia.     Thoracic back pain  He reports that just after Marilla he woke up with some back pain just behind his left shoulder blade. He reports that it almost hurt to breathe when the pain was intense. He has noticed that the pain has since improved, he hardly notices any tenderness/pain. Denies injury, numbness/tingling, decreased ROM.     ROS:   Gen: no fevers/chills, no changes in weight  Eyes: no changes in vision  ENT: no sore throat, no hearing loss, no bloody nose  Pulm: no sob, no cough  CV: no chest pain, no palpitations  GI: no nausea/vomiting, no diarrhea  : no dysuria  MSk: no myalgias  Skin: no rash  Neuro: no headaches, no numbness/tingling  Heme/Lymph: no easy bruising        - NOTE: All other systems reviewed and are negative, except as in HPI.      Objective:     Exam: /62 (BP Location: Right arm, Patient Position: Sitting, BP Cuff Size: Adult)   Pulse 66   Temp 36.4 °C (97.5 °F)   Resp 14   Ht 1.778 m (5' 10\")   Wt 83.6 kg (184 lb 6.4 oz)   SpO2 97%  Body mass index is 26.46 " kg/m².    Constitutional: Alert, no distress, well-groomed.  Skin: Warm, dry, good turgor, no rashes in visible areas.  Eye: Equal, round and reactive, conjunctiva clear, lids normal.  ENMT: Lips without lesions, good dentition, moist mucous membranes.  Neck: Trachea midline, no masses, no thyromegaly.  Respiratory: Unlabored respiratory effort, no cough. Clear to ausculation. No rales, ronchi, or wheezing.  Cardiovascular: Regular rate and rhythm without murmur. Carotid and radial pulses are intact and equal bilaterally.  MSK: Normal gait, moves all extremities.  Neuro: Grossly non-focal.   Psych: Alert and oriented x3, normal affect and mood.    Assessment & Plan:   66 y.o. male with the following -    1. Left-sided thoracic back pain, unspecified chronicity  The strain appears minor and would be expected to resolve with proper treatment.  If symptoms persist, the patient would benefit from physical therapy and/or imaging. Ice to affected areas 15-20 minutes at a time every 2 hours. Ibuprofen 800 mg three times daily with food.     2. Benign prostatic hyperplasia with lower urinary tract symptoms, symptom details unspecified  Chronic, stable condition.  Patient should continue medication as prescribed.  Continue to follow-up with urology.    3. Dyslipidemia  Labs as indicated. Continue statin medication and lifestyle modifications.  Continue to monitor.  - Lipid Profile; Future  - atorvastatin (LIPITOR) 20 MG Tab; Take 1 Tablet by mouth every day.  Dispense: 90 Tablet; Refill: 3    4. Health maintenance examination  Labs as indicated.   - CBC WITH DIFFERENTIAL; Future  - Comp Metabolic Panel; Future  - HEMOGLOBIN A1C; Future  - TSH WITH REFLEX TO FT4; Future  - VITAMIN D,25 HYDROXY; Future    5. Need for hepatitis C screening test  Labs ordered.   - HEP C VIRUS ANTIBODY; Future    6. Need for vaccination  Vaccinated today in office.   - PneumoVax (PPSV23) =>1yo    7. Screening for colorectal cancer  Referral  placed for colonoscopy.  - Referral to GI for Colonoscopy    Please note that this dictation was created using voice recognition software. I have made every reasonable attempt to correct obvious errors, but I expect that there are errors of grammar and possibly content that I did not discover before finalizing the note.

## 2022-03-10 NOTE — ASSESSMENT & PLAN NOTE
Doing well. Due for lab work. He was previously taking atorvastatin 20 mg, but has not had this refilled for several years. He has been managing with lifestyle modifications, but does have a strong family history of hyperlipidemia.

## 2022-03-10 NOTE — ASSESSMENT & PLAN NOTE
Followed by urology - [facility], has an appointment at the end of this month. He is currently taking Finasteride. No current symptoms.

## 2022-03-16 ENCOUNTER — HOSPITAL ENCOUNTER (OUTPATIENT)
Dept: LAB | Facility: MEDICAL CENTER | Age: 67
End: 2022-03-16
Attending: UROLOGY
Payer: COMMERCIAL

## 2022-03-16 ENCOUNTER — HOSPITAL ENCOUNTER (OUTPATIENT)
Dept: LAB | Facility: MEDICAL CENTER | Age: 67
End: 2022-03-16
Attending: NURSE PRACTITIONER
Payer: COMMERCIAL

## 2022-03-16 DIAGNOSIS — E78.5 DYSLIPIDEMIA: ICD-10-CM

## 2022-03-16 DIAGNOSIS — Z00.00 HEALTH MAINTENANCE EXAMINATION: ICD-10-CM

## 2022-03-16 DIAGNOSIS — Z11.59 NEED FOR HEPATITIS C SCREENING TEST: ICD-10-CM

## 2022-03-16 LAB
25(OH)D3 SERPL-MCNC: 28 NG/ML (ref 30–100)
ALBUMIN SERPL BCP-MCNC: 4.3 G/DL (ref 3.2–4.9)
ALBUMIN/GLOB SERPL: 1.8 G/DL
ALP SERPL-CCNC: 46 U/L (ref 30–99)
ALT SERPL-CCNC: 26 U/L (ref 2–50)
ANION GAP SERPL CALC-SCNC: 10 MMOL/L (ref 7–16)
AST SERPL-CCNC: 20 U/L (ref 12–45)
BASOPHILS # BLD AUTO: 1.1 % (ref 0–1.8)
BASOPHILS # BLD: 0.05 K/UL (ref 0–0.12)
BILIRUB SERPL-MCNC: 0.6 MG/DL (ref 0.1–1.5)
BUN SERPL-MCNC: 23 MG/DL (ref 8–22)
CALCIUM SERPL-MCNC: 9.2 MG/DL (ref 8.4–10.2)
CHLORIDE SERPL-SCNC: 107 MMOL/L (ref 96–112)
CHOLEST SERPL-MCNC: 174 MG/DL (ref 100–199)
CO2 SERPL-SCNC: 21 MMOL/L (ref 20–33)
CREAT SERPL-MCNC: 0.98 MG/DL (ref 0.5–1.4)
EOSINOPHIL # BLD AUTO: 0.07 K/UL (ref 0–0.51)
EOSINOPHIL NFR BLD: 1.5 % (ref 0–6.9)
ERYTHROCYTE [DISTWIDTH] IN BLOOD BY AUTOMATED COUNT: 41.2 FL (ref 35.9–50)
EST. AVERAGE GLUCOSE BLD GHB EST-MCNC: 105 MG/DL
FASTING STATUS PATIENT QL REPORTED: NORMAL
GFR SERPLBLD CREATININE-BSD FMLA CKD-EPI: 84 ML/MIN/1.73 M 2
GLOBULIN SER CALC-MCNC: 2.4 G/DL (ref 1.9–3.5)
GLUCOSE SERPL-MCNC: 97 MG/DL (ref 65–99)
HBA1C MFR BLD: 5.3 % (ref 4–5.6)
HCT VFR BLD AUTO: 45.1 % (ref 42–52)
HCV AB SER QL: NORMAL
HDLC SERPL-MCNC: 41 MG/DL
HGB BLD-MCNC: 15.3 G/DL (ref 14–18)
IMM GRANULOCYTES # BLD AUTO: 0.07 K/UL (ref 0–0.11)
IMM GRANULOCYTES NFR BLD AUTO: 1.5 % (ref 0–0.9)
LDLC SERPL CALC-MCNC: 112 MG/DL
LYMPHOCYTES # BLD AUTO: 0.96 K/UL (ref 1–4.8)
LYMPHOCYTES NFR BLD: 21 % (ref 22–41)
MCH RBC QN AUTO: 31.7 PG (ref 27–33)
MCHC RBC AUTO-ENTMCNC: 33.9 G/DL (ref 33.7–35.3)
MCV RBC AUTO: 93.4 FL (ref 81.4–97.8)
MONOCYTES # BLD AUTO: 0.4 K/UL (ref 0–0.85)
MONOCYTES NFR BLD AUTO: 8.7 % (ref 0–13.4)
NEUTROPHILS # BLD AUTO: 3.03 K/UL (ref 1.82–7.42)
NEUTROPHILS NFR BLD: 66.2 % (ref 44–72)
NRBC # BLD AUTO: 0 K/UL
NRBC BLD-RTO: 0 /100 WBC
PLATELET # BLD AUTO: 232 K/UL (ref 164–446)
PMV BLD AUTO: 10.8 FL (ref 9–12.9)
POTASSIUM SERPL-SCNC: 4.3 MMOL/L (ref 3.6–5.5)
PROT SERPL-MCNC: 6.7 G/DL (ref 6–8.2)
RBC # BLD AUTO: 4.83 M/UL (ref 4.7–6.1)
SODIUM SERPL-SCNC: 138 MMOL/L (ref 135–145)
TRIGL SERPL-MCNC: 107 MG/DL (ref 0–149)
TSH SERPL DL<=0.005 MIU/L-ACNC: 1.15 UIU/ML (ref 0.38–5.33)
WBC # BLD AUTO: 4.6 K/UL (ref 4.8–10.8)

## 2022-03-16 PROCEDURE — 84154 ASSAY OF PSA FREE: CPT

## 2022-03-16 PROCEDURE — 84443 ASSAY THYROID STIM HORMONE: CPT

## 2022-03-16 PROCEDURE — 80061 LIPID PANEL: CPT

## 2022-03-16 PROCEDURE — 83036 HEMOGLOBIN GLYCOSYLATED A1C: CPT

## 2022-03-16 PROCEDURE — 86803 HEPATITIS C AB TEST: CPT

## 2022-03-16 PROCEDURE — 82306 VITAMIN D 25 HYDROXY: CPT

## 2022-03-16 PROCEDURE — 80053 COMPREHEN METABOLIC PANEL: CPT

## 2022-03-16 PROCEDURE — 36415 COLL VENOUS BLD VENIPUNCTURE: CPT

## 2022-03-16 PROCEDURE — 85025 COMPLETE CBC W/AUTO DIFF WBC: CPT

## 2022-03-16 PROCEDURE — 84153 ASSAY OF PSA TOTAL: CPT

## 2022-03-18 DIAGNOSIS — R79.89 ABNORMAL CBC: ICD-10-CM

## 2022-03-20 LAB
PSA FREE MFR SERPL: 16 %
PSA FREE SERPL-MCNC: 0.7 NG/ML
PSA SERPL-MCNC: 4.4 NG/ML (ref 0–4)

## 2022-03-21 ENCOUNTER — TELEPHONE (OUTPATIENT)
Dept: MEDICAL GROUP | Facility: LAB | Age: 67
End: 2022-03-21
Payer: COMMERCIAL

## 2022-03-21 DIAGNOSIS — R79.89 ABNORMAL CBC: ICD-10-CM

## 2022-03-21 NOTE — TELEPHONE ENCOUNTER
----- Message from MAURIZIO Clark sent at 3/21/2022  9:40 AM PDT -----  Can you please call the patient and let him know that his lab results looked fine, except he did have some slight abnormalities to his blood panel. I would like to recheck it in about 1 month to see how things are trending. I have already placed the order. Thank you!

## 2022-03-21 NOTE — TELEPHONE ENCOUNTER
LVM informing patient his labs looked fine , some abnormalities on his blood panel . Serene would like him to repeat lab in a month . ORDER HAS BEEN PLACED

## 2022-04-18 ENCOUNTER — HOSPITAL ENCOUNTER (OUTPATIENT)
Dept: LAB | Facility: MEDICAL CENTER | Age: 67
End: 2022-04-18
Attending: NURSE PRACTITIONER
Payer: COMMERCIAL

## 2022-04-18 DIAGNOSIS — R79.89 ABNORMAL CBC: ICD-10-CM

## 2022-04-18 LAB
BASOPHILS # BLD AUTO: 1.6 % (ref 0–1.8)
BASOPHILS # BLD: 0.09 K/UL (ref 0–0.12)
EOSINOPHIL # BLD AUTO: 0.1 K/UL (ref 0–0.51)
EOSINOPHIL NFR BLD: 1.7 % (ref 0–6.9)
ERYTHROCYTE [DISTWIDTH] IN BLOOD BY AUTOMATED COUNT: 41.9 FL (ref 35.9–50)
HCT VFR BLD AUTO: 45 % (ref 42–52)
HGB BLD-MCNC: 15.3 G/DL (ref 14–18)
IMM GRANULOCYTES # BLD AUTO: 0.07 K/UL (ref 0–0.11)
IMM GRANULOCYTES NFR BLD AUTO: 1.2 % (ref 0–0.9)
LYMPHOCYTES # BLD AUTO: 1.43 K/UL (ref 1–4.8)
LYMPHOCYTES NFR BLD: 25 % (ref 22–41)
MCH RBC QN AUTO: 31.9 PG (ref 27–33)
MCHC RBC AUTO-ENTMCNC: 34 G/DL (ref 33.7–35.3)
MCV RBC AUTO: 93.8 FL (ref 81.4–97.8)
MONOCYTES # BLD AUTO: 0.48 K/UL (ref 0–0.85)
MONOCYTES NFR BLD AUTO: 8.4 % (ref 0–13.4)
NEUTROPHILS # BLD AUTO: 3.55 K/UL (ref 1.82–7.42)
NEUTROPHILS NFR BLD: 62.1 % (ref 44–72)
NRBC # BLD AUTO: 0 K/UL
NRBC BLD-RTO: 0 /100 WBC
PLATELET # BLD AUTO: 254 K/UL (ref 164–446)
PMV BLD AUTO: 10.9 FL (ref 9–12.9)
RBC # BLD AUTO: 4.8 M/UL (ref 4.7–6.1)
WBC # BLD AUTO: 5.7 K/UL (ref 4.8–10.8)

## 2022-04-18 PROCEDURE — 36415 COLL VENOUS BLD VENIPUNCTURE: CPT

## 2022-04-18 PROCEDURE — 85025 COMPLETE CBC W/AUTO DIFF WBC: CPT

## 2022-04-19 ENCOUNTER — TELEPHONE (OUTPATIENT)
Dept: MEDICAL GROUP | Facility: LAB | Age: 67
End: 2022-04-19
Payer: COMMERCIAL

## 2022-04-19 NOTE — TELEPHONE ENCOUNTER
----- Message from MAURIZIO Clark sent at 4/18/2022  9:10 AM PDT -----  Can you please let him know that his blood panel results were normal?    Thank you!

## 2023-01-31 ENCOUNTER — OFFICE VISIT (OUTPATIENT)
Dept: MEDICAL GROUP | Facility: LAB | Age: 68
End: 2023-01-31
Payer: COMMERCIAL

## 2023-01-31 VITALS
TEMPERATURE: 97.2 F | SYSTOLIC BLOOD PRESSURE: 112 MMHG | HEIGHT: 70 IN | BODY MASS INDEX: 25.83 KG/M2 | HEART RATE: 60 BPM | OXYGEN SATURATION: 98 % | RESPIRATION RATE: 12 BRPM | DIASTOLIC BLOOD PRESSURE: 74 MMHG | WEIGHT: 180.4 LBS

## 2023-01-31 DIAGNOSIS — N40.1 BENIGN PROSTATIC HYPERPLASIA WITH LOWER URINARY TRACT SYMPTOMS, SYMPTOM DETAILS UNSPECIFIED: ICD-10-CM

## 2023-01-31 DIAGNOSIS — Z23 NEED FOR VACCINATION: ICD-10-CM

## 2023-01-31 DIAGNOSIS — E55.9 VITAMIN D DEFICIENCY: ICD-10-CM

## 2023-01-31 DIAGNOSIS — R97.20 ELEVATED PSA: ICD-10-CM

## 2023-01-31 DIAGNOSIS — E78.5 DYSLIPIDEMIA: ICD-10-CM

## 2023-01-31 PROCEDURE — 90662 IIV NO PRSV INCREASED AG IM: CPT | Performed by: FAMILY MEDICINE

## 2023-01-31 PROCEDURE — 90471 IMMUNIZATION ADMIN: CPT | Performed by: FAMILY MEDICINE

## 2023-01-31 PROCEDURE — 99214 OFFICE O/P EST MOD 30 MIN: CPT | Mod: 25 | Performed by: FAMILY MEDICINE

## 2023-01-31 RX ORDER — MULTIVIT-MIN/IRON/FOLIC ACID/K 18-600-40
CAPSULE ORAL
COMMUNITY

## 2023-01-31 RX ORDER — FINASTERIDE 5 MG/1
TABLET, FILM COATED ORAL
COMMUNITY
Start: 2022-11-07

## 2023-01-31 ASSESSMENT — FIBROSIS 4 INDEX: FIB4 SCORE: 1.05

## 2023-01-31 NOTE — PROGRESS NOTES
"Felipe Hutson is a 67 y.o. male here to establish care and discuss chronic medical conditions.  No acute concerns.    Follows with [facility] for BPH and history of elevated PSA.  No recent imaging or biopsy needed.  Reports he did have TURP 10+ years ago with negative pathology.  He does continue on finasteride.    Also on Lipitor for dyslipidemia.  Overdue for repeat colonoscopy.      Current medicines (including changes today)  Current Outpatient Medications   Medication Sig Dispense Refill    finasteride (PROSCAR) 5 MG Tab TAKE 1/2 TAB(S) ORALLY ONCE A DAY 90 DAY(S)      Cholecalciferol (VITAMIN D) 50 MCG (2000 UT) Cap Take  by mouth. Only takes during the winter season      atorvastatin (LIPITOR) 20 MG Tab Take 1 Tablet by mouth every day. 90 Tablet 3     No current facility-administered medications for this visit.     He  has a past medical history of BPH (6/10/2009).  He  has a past surgical history that includes hydrocelectomy adult and turp-vapor (N/A).  Social History     Tobacco Use    Smoking status: Never    Smokeless tobacco: Never   Substance Use Topics    Alcohol use: No     Comment: rare    Drug use: No     Social History     Social History Narrative    ** Merged History Encounter **          Family History   Problem Relation Age of Onset    Hyperlipidemia Father     Cancer Father 99        lung    Hyperlipidemia Maternal Grandmother     Heart Disease Maternal Grandmother      Family Status   Relation Name Status    Fa  (Not Specified)    MGMo  (Not Specified)       ROS  See HPI     Objective:     Physical Exam:  /74 (BP Location: Left arm, Patient Position: Sitting, BP Cuff Size: Adult)   Pulse 60   Temp 36.2 °C (97.2 °F)   Resp 12   Ht 1.778 m (5' 10\")   Wt 81.8 kg (180 lb 6.4 oz)   SpO2 98%  Body mass index is 25.88 kg/m².  Constitutional: Alert. Well appearing. No distress.  Skin: Warm, dry, good turgor, no visible rashes.  Eye: Equal, round and reactive to light, " conjunctiva clear, lids normal.  ENMT: Moist mucous membranes.   Neck: Trachea midline, no masses, no thyromegaly.  Respiratory: Normal effort. Lungs are clear to auscultation bilaterally.  Cardiovascular: Regular rate and rhythm. Normal S1/S2. No murmurs, rubs or gallops.   Neuro: Moves all four extremities. No facial droop.  Psych: Answers questions appropriately. Normal affect and mood.    Assessment and Plan:     1. Benign prostatic hyperplasia with lower urinary tract symptoms, symptom details unspecified  2. Elevated PSA  Follows with urology, continue finasteride.  They are monitoring PSA.  - CBC WITH DIFFERENTIAL; Future  - Comp Metabolic Panel; Future    3. Dyslipidemia  Continue Lipitor, recheck lipids.  - Lipid Profile; Future    4. Vitamin D deficiency  Stable, continue vitamin D 2000 units daily, recheck.  - VITAMIN D,25 HYDROXY (DEFICIENCY); Future    5. Need for vaccination  - Influenza Vaccine, High Dose (65+ Only)    Follow up: No follow-ups on file.         PLEASE NOTE: This note was created using voice recognition software.

## 2023-02-07 DIAGNOSIS — E78.5 DYSLIPIDEMIA: ICD-10-CM

## 2023-02-07 RX ORDER — ATORVASTATIN CALCIUM 20 MG/1
20 TABLET, FILM COATED ORAL DAILY
Qty: 90 TABLET | Refills: 3 | Status: SHIPPED | OUTPATIENT
Start: 2023-02-07

## 2023-02-08 NOTE — TELEPHONE ENCOUNTER
Received request via: Pharmacy    Was the patient seen in the last year in this department? Yes  1/31/23  Does the patient have an active prescription (recently filled or refills available) for medication(s) requested? No    Does the patient have detention Plus and need 100 day supply (blood pressure, diabetes and cholesterol meds only)? Patient does not have SCP

## 2023-04-10 ENCOUNTER — OFFICE VISIT (OUTPATIENT)
Dept: URGENT CARE | Facility: CLINIC | Age: 68
End: 2023-04-10
Payer: COMMERCIAL

## 2023-04-10 VITALS
RESPIRATION RATE: 18 BRPM | TEMPERATURE: 98.5 F | DIASTOLIC BLOOD PRESSURE: 64 MMHG | HEART RATE: 72 BPM | BODY MASS INDEX: 24.34 KG/M2 | OXYGEN SATURATION: 97 % | SYSTOLIC BLOOD PRESSURE: 114 MMHG | HEIGHT: 70 IN | WEIGHT: 170 LBS

## 2023-04-10 DIAGNOSIS — L03.012 PARONYCHIA OF LEFT RING FINGER: ICD-10-CM

## 2023-04-10 PROCEDURE — 99213 OFFICE O/P EST LOW 20 MIN: CPT | Performed by: NURSE PRACTITIONER

## 2023-04-10 RX ORDER — SULFAMETHOXAZOLE AND TRIMETHOPRIM 800; 160 MG/1; MG/1
1 TABLET ORAL 2 TIMES DAILY
Qty: 14 TABLET | Refills: 0 | Status: SHIPPED | OUTPATIENT
Start: 2023-04-10 | End: 2023-04-17

## 2023-04-10 RX ORDER — IBUPROFEN 200 MG
200 TABLET ORAL EVERY 6 HOURS PRN
COMMUNITY

## 2023-04-10 ASSESSMENT — FIBROSIS 4 INDEX: FIB4 SCORE: 1.07

## 2023-04-10 NOTE — PROGRESS NOTES
Patient has consented to treatment and for use of patient information for treatment and billing purposes.    Date: 04/10/23     Arrival Mode: Private Vehicle    Chief Complaint:    Chief Complaint   Patient presents with    Finger Pain     Lt index finger, redness, pus x hit it against a rock for more than a week, started as painful        History of Present Illness: 68 y.o.  male presents to clinic with left 4th  finger pain redness and swelling.  Patient states he stubbed his finger more than 1 week ago.  He states he did not have immediate pain after stubbing it.  He states over the past week or so there has been redness and swelling he has noticed that there is an area of pus collection as well.  He denies any fever and or body aches.  Denies any history of MRSA infection.  He is unsure if he is up-to-date on his tetanus vaccination.  No shortness of breath chest pain or leg swelling.      ROS:    As stated in HPI     Pertinent Medical History:  Past Medical History:   Diagnosis Date    BPH 6/10/2009        Pertinent Surgical History:  Past Surgical History:   Procedure Laterality Date    HYDROCELECTOMY ADULT      TURP-VAPOR N/A         Pertinent Medications:    Current Outpatient Medications on File Prior to Visit   Medication Sig Dispense Refill    ibuprofen (MOTRIN) 200 MG Tab Take 200 mg by mouth every 6 hours as needed.      atorvastatin (LIPITOR) 20 MG Tab TAKE 1 TABLET BY MOUTH EVERY DAY 90 Tablet 3    Cholecalciferol (VITAMIN D) 50 MCG (2000 UT) Cap Take  by mouth. Only takes during the winter season      finasteride (PROSCAR) 5 MG Tab TAKE 1/2 TAB(S) ORALLY ONCE A DAY 90 DAY(S)       No current facility-administered medications on file prior to visit.        Allergies:    Patient has no known allergies.     Social History:  Social History     Tobacco Use    Smoking status: Never    Smokeless tobacco: Never   Vaping Use    Vaping Use: Never used   Substance Use Topics    Alcohol use: No     Comment: rare     Drug use: No        No LMP for male patient.           Physical Exam:    Vitals:    04/10/23 1602   BP: 114/64   Pulse: 72   Resp: 18   Temp: 36.9 °C (98.5 °F)   SpO2: 97%             Physical Exam  Constitutional:       Appearance: Normal appearance. He is not ill-appearing or toxic-appearing.   HENT:      Head: Normocephalic and atraumatic.   Musculoskeletal:      Left hand: There is no disruption of two-point discrimination. Normal capillary refill. Normal pulse.      Comments: Left fourth finger there is a paronychia noted to the lateral nail fold.  Fluctuant area with visible purulent fluid noted.  The erythema and swelling does not extend to the pad of the finger.  There is no bony tenderness or decreased range of motion.    Using shared decision making did mary jane paronychia.     Procedure Note Abscess I&D:    Risks versus benefits and procedure explained. Verbal informed consent obtained.  Sterile drape and prep were done. The fluctuant center was incised using an #11 blade scalpel. A large amount of purulent fluid was drained and expressed with moderate pressure. Wound was probed for loculated areas and irrigated with normal saline. The wound was left open to allow for continued drainage. Dry sterile dressing was applied. Patient tolerated procedure well with no complications.    Neurological:      Mental Status: He is alert.          Diagnostics:      Medical Decision making and clinic course :  I personally reviewed prior external notes and test results pertinent to today's visit. Pt is clinically stable at today's acute urgent care visit.  No acute distress noted. Appropriate for outpatient care at this time. Shared decision-making was utilized with patient for treatment plan.    Pleasant nontoxic-appearing 68-year-old male presenting clinic with a paronychia drained as above.  Patient is up-to-date on tetanus per chart review.  Will place patient on Bactrim at this time.  Encouraged warm Epsom salt  soaks multiple times daily.  Keep covered and clean while at work.  Monitor for signs and symptoms of worsening infection including increased redness swelling or pain.    The patient remained stable during the urgent care visit.    Plan:    Medication discussed included indication for use and the potential  benefits and side effects.         1. Paronychia of left ring finger    - sulfamethoxazole-trimethoprim (BACTRIM DS) 800-160 MG tablet; Take 1 Tablet by mouth 2 times a day for 7 days.  Dispense: 14 Tablet; Refill: 0       All of the patient's questions were answered to their satisfaction at the time of discharge.    Follow up:    Recommended f/u in  3 dys  if there is no improvement.    Patient was encouraged to monitor symptoms closely. Those signs and symptoms which would warrant concern and mandate seeking a higher level of service through the emergency department discussed at length and included in discharge papers.  Patient stated agreement and understanding of this plan of care.    Disposition:  Home in stable condition       Voice Recognition Disclaimer:  Portions of this document were created using voice recognition software. The software does have a chance of producing errors of grammar and possibly content. I have made every reasonable attempt to correct obvious errors, but there may be errors of grammar and possibly content that I did not discover before finalizing the documentation.    Serene Borges, [email or web address].

## 2024-04-11 DIAGNOSIS — E78.5 DYSLIPIDEMIA: ICD-10-CM

## 2024-04-11 RX ORDER — ATORVASTATIN CALCIUM 20 MG/1
20 TABLET, FILM COATED ORAL DAILY
Qty: 90 TABLET | Refills: 0 | Status: SHIPPED | OUTPATIENT
Start: 2024-04-11

## 2024-04-11 NOTE — TELEPHONE ENCOUNTER
Received request via: Pharmacy    Was the patient seen in the last year in this department? No  1/31/23  Does the patient have an active prescription (recently filled or refills available) for medication(s) requested? No    Pharmacy Name: [facility]    Does the patient have USP Plus and need 100 day supply (blood pressure, diabetes and cholesterol meds only)? Patient does not have SCP

## 2024-07-15 DIAGNOSIS — E78.5 DYSLIPIDEMIA: ICD-10-CM

## 2024-07-15 RX ORDER — ATORVASTATIN CALCIUM 20 MG/1
20 TABLET, FILM COATED ORAL DAILY
Qty: 90 TABLET | Refills: 0 | Status: SHIPPED | OUTPATIENT
Start: 2024-07-15

## 2024-08-01 ENCOUNTER — APPOINTMENT (OUTPATIENT)
Dept: MEDICAL GROUP | Facility: LAB | Age: 69
End: 2024-08-01
Payer: COMMERCIAL

## 2024-08-01 VITALS
TEMPERATURE: 97.9 F | SYSTOLIC BLOOD PRESSURE: 112 MMHG | RESPIRATION RATE: 18 BRPM | BODY MASS INDEX: 24.45 KG/M2 | HEIGHT: 70 IN | DIASTOLIC BLOOD PRESSURE: 58 MMHG | OXYGEN SATURATION: 94 % | WEIGHT: 170.8 LBS | HEART RATE: 72 BPM

## 2024-08-01 DIAGNOSIS — Z23 NEED FOR VACCINATION: ICD-10-CM

## 2024-08-01 DIAGNOSIS — E78.5 DYSLIPIDEMIA: ICD-10-CM

## 2024-08-01 DIAGNOSIS — R97.20 ELEVATED PSA: ICD-10-CM

## 2024-08-01 DIAGNOSIS — Z12.11 SCREEN FOR COLON CANCER: ICD-10-CM

## 2024-08-01 DIAGNOSIS — R10.31 RIGHT GROIN PAIN: ICD-10-CM

## 2024-08-01 PROCEDURE — 3074F SYST BP LT 130 MM HG: CPT | Performed by: FAMILY MEDICINE

## 2024-08-01 PROCEDURE — 3078F DIAST BP <80 MM HG: CPT | Performed by: FAMILY MEDICINE

## 2024-08-01 PROCEDURE — 90471 IMMUNIZATION ADMIN: CPT | Performed by: FAMILY MEDICINE

## 2024-08-01 PROCEDURE — 90677 PCV20 VACCINE IM: CPT | Performed by: FAMILY MEDICINE

## 2024-08-01 PROCEDURE — 99214 OFFICE O/P EST MOD 30 MIN: CPT | Mod: 25 | Performed by: FAMILY MEDICINE

## 2024-08-01 NOTE — PROGRESS NOTES
"Subjective:     CC: Concern for hernia    HPI:   Felipe presents today with concern for hernia.  He was lifting a flowerpot a few months ago and felt a ripping sensation to his right groin.  He has had some position related right groin pain since then but nothing persistent.  No bulge.  Does note that there is some chronic right groin/scrotum fullness secondary to right sided hydrocelectomy.    Medications, past medical history, allergies, and social history have been reviewed and updated.      Objective:       Exam:  /58   Pulse 72   Temp 36.6 °C (97.9 °F) (Temporal)   Resp 18   Ht 1.778 m (5' 10\")   Wt 77.5 kg (170 lb 12.8 oz)   SpO2 94%   BMI 24.51 kg/m²  Body mass index is 24.51 kg/m².    Constitutional: Alert. Well appearing. No distress.  Skin: Warm, dry, good turgor, no visible rashes.  ENMT: Moist mucous membranes. Normal dentition.  Respiratory: Normal effort. Lungs are clear to auscultation bilaterally.  Cardiovascular: Regular rate and rhythm. Normal S1/S2. No murmurs, rubs or gallops.   Genitalia: normal circumcised penis, scrotal contents normal to inspection and palpation, no hernia detected.  There is some mild fullness to the right spermatic cord.  Nontender.  Neuro: Moves all four extremities. No facial droop.  Psych: Answers questions appropriately. Normal affect and mood.    Assessment & Plan:     69 y.o. male with the following -     1. Right groin pain  Right groin pain after moving heavy flower pot a few months ago.  No obvious hernia or area of tenderness on exam.  There is some right spermatic cord fullness that he reports has been stable since right hydrocelectomy.  I think more likely groin strain than hernia.  Will hold on further evaluation at this point.  Recommend follow-up if pain persistent or worsening.  I did also recommend that he do self exams and follow-up if he is noticing bulge or changing fullness to the right groin or spermatic cord.    2. Dyslipidemia  Continue " atorvastatin 20 mg daily, recheck lipids.  - Lipid Profile; Future    3. Elevated PSA  Had been following with urology but provider changed practices and has not had recent follow-up.  Will check PSA.  - CBC WITH DIFFERENTIAL; Future  - Comp Metabolic Panel; Future  - PROSTATE SPECIFIC AG DIAGNOSTIC; Future    4. Screen for colon cancer  - COLOGUARD (FIT DNA)    5. Need for vaccination  - Pneumococcal Conjugate Vaccine 20-Valent      Please note that this note was created using voice recognition software.

## 2024-08-02 ENCOUNTER — HOSPITAL ENCOUNTER (OUTPATIENT)
Dept: LAB | Facility: MEDICAL CENTER | Age: 69
End: 2024-08-02
Attending: FAMILY MEDICINE
Payer: COMMERCIAL

## 2024-08-02 ENCOUNTER — TELEPHONE (OUTPATIENT)
Dept: MEDICAL GROUP | Facility: LAB | Age: 69
End: 2024-08-02
Payer: COMMERCIAL

## 2024-08-02 DIAGNOSIS — E78.5 DYSLIPIDEMIA: ICD-10-CM

## 2024-08-02 DIAGNOSIS — R97.20 ELEVATED PSA: ICD-10-CM

## 2024-08-02 LAB
ALBUMIN SERPL BCP-MCNC: 4.4 G/DL (ref 3.2–4.9)
ALBUMIN/GLOB SERPL: 1.5 G/DL
ALP SERPL-CCNC: 55 U/L (ref 30–99)
ALT SERPL-CCNC: 15 U/L (ref 2–50)
ANION GAP SERPL CALC-SCNC: 9 MMOL/L (ref 7–16)
AST SERPL-CCNC: 16 U/L (ref 12–45)
BASOPHILS # BLD AUTO: 1 % (ref 0–1.8)
BASOPHILS # BLD: 0.08 K/UL (ref 0–0.12)
BILIRUB SERPL-MCNC: 0.5 MG/DL (ref 0.1–1.5)
BUN SERPL-MCNC: 23 MG/DL (ref 8–22)
CALCIUM ALBUM COR SERPL-MCNC: 9.2 MG/DL (ref 8.5–10.5)
CALCIUM SERPL-MCNC: 9.5 MG/DL (ref 8.4–10.2)
CHLORIDE SERPL-SCNC: 107 MMOL/L (ref 96–112)
CHOLEST SERPL-MCNC: 165 MG/DL (ref 100–199)
CO2 SERPL-SCNC: 24 MMOL/L (ref 20–33)
CREAT SERPL-MCNC: 1 MG/DL (ref 0.5–1.4)
EOSINOPHIL # BLD AUTO: 0.14 K/UL (ref 0–0.51)
EOSINOPHIL NFR BLD: 1.8 % (ref 0–6.9)
ERYTHROCYTE [DISTWIDTH] IN BLOOD BY AUTOMATED COUNT: 42.7 FL (ref 35.9–50)
FASTING STATUS PATIENT QL REPORTED: NORMAL
GFR SERPLBLD CREATININE-BSD FMLA CKD-EPI: 81 ML/MIN/1.73 M 2
GLOBULIN SER CALC-MCNC: 3 G/DL (ref 1.9–3.5)
GLUCOSE SERPL-MCNC: 95 MG/DL (ref 65–99)
HCT VFR BLD AUTO: 42.8 % (ref 42–52)
HDLC SERPL-MCNC: 39 MG/DL
HGB BLD-MCNC: 14.6 G/DL (ref 14–18)
IMM GRANULOCYTES # BLD AUTO: 0.06 K/UL (ref 0–0.11)
IMM GRANULOCYTES NFR BLD AUTO: 0.8 % (ref 0–0.9)
LDLC SERPL CALC-MCNC: 101 MG/DL
LYMPHOCYTES # BLD AUTO: 1.08 K/UL (ref 1–4.8)
LYMPHOCYTES NFR BLD: 13.6 % (ref 22–41)
MCH RBC QN AUTO: 32.1 PG (ref 27–33)
MCHC RBC AUTO-ENTMCNC: 34.1 G/DL (ref 32.3–36.5)
MCV RBC AUTO: 94.1 FL (ref 81.4–97.8)
MONOCYTES # BLD AUTO: 0.56 K/UL (ref 0–0.85)
MONOCYTES NFR BLD AUTO: 7.1 % (ref 0–13.4)
NEUTROPHILS # BLD AUTO: 6.01 K/UL (ref 1.82–7.42)
NEUTROPHILS NFR BLD: 75.7 % (ref 44–72)
NRBC # BLD AUTO: 0 K/UL
NRBC BLD-RTO: 0 /100 WBC (ref 0–0.2)
PLATELET # BLD AUTO: 214 K/UL (ref 164–446)
PMV BLD AUTO: 11.3 FL (ref 9–12.9)
POTASSIUM SERPL-SCNC: 4.4 MMOL/L (ref 3.6–5.5)
PROT SERPL-MCNC: 7.4 G/DL (ref 6–8.2)
PSA SERPL-MCNC: 8.52 NG/ML (ref 0–4)
RBC # BLD AUTO: 4.55 M/UL (ref 4.7–6.1)
SODIUM SERPL-SCNC: 140 MMOL/L (ref 135–145)
TRIGL SERPL-MCNC: 125 MG/DL (ref 0–149)
WBC # BLD AUTO: 7.9 K/UL (ref 4.8–10.8)

## 2024-08-02 PROCEDURE — 84153 ASSAY OF PSA TOTAL: CPT

## 2024-08-02 PROCEDURE — 85025 COMPLETE CBC W/AUTO DIFF WBC: CPT

## 2024-08-02 PROCEDURE — 80053 COMPREHEN METABOLIC PANEL: CPT

## 2024-08-02 PROCEDURE — 36415 COLL VENOUS BLD VENIPUNCTURE: CPT

## 2024-08-02 PROCEDURE — 80061 LIPID PANEL: CPT

## 2024-08-08 ENCOUNTER — TELEPHONE (OUTPATIENT)
Dept: MEDICAL GROUP | Facility: LAB | Age: 69
End: 2024-08-08
Payer: COMMERCIAL

## 2024-08-08 DIAGNOSIS — R10.31 RIGHT GROIN PAIN: ICD-10-CM

## 2024-08-08 NOTE — PROGRESS NOTES
Subjective  Felipe Hutson is a 69 y.o. male who presents today for elevated PSA. He has followed with [facility] for BPH and history of elevated PSA. No recent imaging or biopsy needed. Reports he did have TURP 10+ years ago with negative pathology. He does continue on finasteride, but 2.5mg daily (half the normal dose. PSA 8.52 on 8/2/24.  This reading is likely affected by his being on finasteride.  He was on 5 mg of finasteride would consider his PSA to be double, at 17.  However since he is on 2.5 mg 1 cannot be certain of his PSA but likely it is in the range of 13. Voids fine, he does not know why he is on finasteride.     Prostatic Specific Antigen Tot   Date Value Ref Range Status   08/02/2024 8.52 (H) 0.00 - 4.00 ng/mL Final     Comment:     Performed using Roche romeo e immunoassay analyzer. tPSA values determined  on patient samples by different testing procedures cannot be directly  compared with one another and could be the cause of erroneous medical  interpretations. If there is a change in the tPSA assay procedure used while  monitoring therapy, then new baselines may need to be established when  comparing previous results.     09/11/2019 5.89 (H) 0.00 - 4.00 ng/mL Final     Comment:     The Access Hybritech PSA assay is a paramagnetic particle,  chemiluminescent immunoassay for the quantitative determination  of total prostate specific antigen (PSA) levels using the  Access Immunoassay System. Values obtained with different  methods cannot be used interchangeably for patient monitoring.     10/15/2018 5.48 (H) 0.00 - 4.00 ng/mL Final     Comment:     The Access Hybritech PSA assay is a paramagnetic particle,  chemiluminescent immunoassay for the quantitative determination  of total prostate specific antigen (PSA) levels using the  Access Immunoassay System. Values obtained with different  methods cannot be used interchangeably for patient monitoring.     10/10/2017 4.85 (H) 0.00 - 4.00  ng/mL Final     Comment:     The Access Hybritech PSA assay is a paramagnetic particle,  chemiluminescent immunoassay for the quantitative determination  of total prostate specific antigen (PSA) levels using the  Access Immunoassay System. Values obtained with different  methods cannot be used interchangeably for patient monitoring.     09/13/2016 5.00 (H) 0.00 - 4.00 ng/mL Final     Comment:     The Access Hybritech PSA assay is a paramagnetic particle,  chemiluminescent immunoassay for the quantitative determination  of total prostate specific antigen (PSA) levels using the  Access Immunoassay System. Values obtained with different  methods cannot be used interchangeably for patient monitoring.     09/09/2015 3.65 0.00 - 4.00 ng/mL Final     Comment:     The Access Hybritech PSA assay is a paramagnetic particle,  chemiluminescent immunoassay for the quantitative determination  of total prostate specific antigen (PSA) levels using the  Access Immunoassay System. Values obtained with different  methods cannot be used interchangeably for patient monitoring.     09/24/2014 3.23 0.00 - 4.00 ng/mL Final     Comment:     The Access Hybritech PSA assay is a paramagnetic particle, chemiluminescent  immunoassay for the quantitative determination of total prostate specific  antigen(PSA) levels using the Access Immunoassay Systems. Values obtained  with different methods cannot be used interchangeably for patient  monitoring.   09/24/2014 3.32 0.00 - 4.00 ng/mL Final     Comment:     The Access Hybritech PSA assay is a paramagnetic particle, chemiluminescent  immunoassay for the quantitative determination of total prostate specific  antigen(PSA) levels using the Access Immunoassay Systems. Values obtained  with different methods cannot be used interchangeably for patient  monitoring.     PSA Total   Date Value Ref Range Status   03/16/2022 4.4 (H) 0.0 - 4.0 ng/mL Final     Comment:     INTERPRETIVE INFORMATION: Prostate  Specific Antigen  The Roche PSA electrochemiluminescent immunoassay is used. Results  obtained with different test methods or kits cannot be used  interchangeably. The Roche PSA method is approved for use as an  aid in the detection of prostate cancer when used in conjunction  with a digital rectal exam in individuals with a prostate age 50  years and older. The Roche PSA is also indicated for the serial  measurement of PSA to aid in the prognosis and management of  prostate cancer patients. Elevated PSA concentrations can only  suggest the presence of prostate cancer until biopsy is performed.  PSA concentrations can also be elevated in benign prostatic  hyperplasia or inflammatory conditions of the prostate. PSA is  generally not elevated in healthy individuals or individuals with  nonprostatic carcinoma.     12/18/2020 5.8 (H) 0.0 - 4.0 ng/mL Final     Comment:     INTERPRETIVE INFORMATION: Prostate Specific Antigen  The Roche PSA electrochemiluminescent immunoassay was used.  Results obtained with different test methods or kits cannot be  used interchangeably. The Roche PSA method is approved for use as  an aid in the detection of prostate cancer when used in  conjunction with a digital rectal exam in men age 50 and older.  The Roche PSA method is also indicated for the serial measurement  of PSA to aid in the prognosis and management of prostate cancer  patients. Elevated PSA concentrations can only suggest the  presence of prostate cancer until biopsy is performed. PSA  concentrations can also be elevated in benign prostatic  hyperplasia or inflammatory conditions of the prostate. PSA is  generally not elevated in healthy men or men with non-prostatic  carcinoma.           Family History   Problem Relation Age of Onset    Hyperlipidemia Father     Cancer Father 99        lung    Hyperlipidemia Maternal Grandmother     Heart Disease Maternal Grandmother        Social History     Socioeconomic History    Marital  status:    Tobacco Use    Smoking status: Never    Smokeless tobacco: Never   Vaping Use    Vaping status: Never Used   Substance and Sexual Activity    Alcohol use: No     Comment: rare    Drug use: No    Sexual activity: Not Currently   Social History Narrative    ** Merged History Encounter **            Past Surgical History:   Procedure Laterality Date    HYDROCELECTOMY ADULT      TURP-VAPOR N/A        Past Medical History:   Diagnosis Date    BPH 6/10/2009       Current Outpatient Medications   Medication Sig    atorvastatin (LIPITOR) 20 MG Tab TAKE 1 TABLET BY MOUTH EVERY DAY    ibuprofen (MOTRIN) 200 MG Tab Take 200 mg by mouth every 6 hours as needed.    finasteride (PROSCAR) 5 MG Tab TAKE 1/2 TAB(S) ORALLY ONCE A DAY 90 DAY(S)    Cholecalciferol (VITAMIN D) 50 MCG (2000 UT) Cap Take  by mouth. Only takes during the winter season (Patient not taking: Reported on 8/1/2024)       No Known Allergies    Objective  There were no vitals taken for this visit.  Physical Exam  Constitutional:       Appearance: Normal appearance. He is normal weight.   Genitourinary:     Comments: RAMYA deferred to MRI  Neurological:      Mental Status: He is alert.           Labs:   PSA   Lab Results   Component Value Date/Time    PSATOTAL 8.52 (H) 08/02/2024 0754       Imaging:   None relevant      Assessment & Plan    Elevated PSA. Likely around 13 with his finasteride 2.5mg dose.  In order to better risk stratify his prostate cancer I discussed a multiparametric MRI.  A 4K score would not be indicated since he is on finasteride. I advised him to discontinue finasteride and he agrees. Will RTC in 1 month after mpMRI.     Jose Nieto M.D., F.A.C.S.  Urologic Oncology  Vice-Chair, Department of Surgery  [facility]

## 2024-08-08 NOTE — TELEPHONE ENCOUNTER
Phone Number Called: [phone] (home)      Call outcome: Spoke to patient regarding message below.    Message: informed pt his us has been ordered

## 2024-08-13 ENCOUNTER — OFFICE VISIT (OUTPATIENT)
Dept: UROLOGY | Facility: MEDICAL CENTER | Age: 69
End: 2024-08-13
Payer: COMMERCIAL

## 2024-08-13 VITALS
SYSTOLIC BLOOD PRESSURE: 125 MMHG | BODY MASS INDEX: 24.34 KG/M2 | HEART RATE: 69 BPM | DIASTOLIC BLOOD PRESSURE: 80 MMHG | TEMPERATURE: 97.9 F | HEIGHT: 70 IN | OXYGEN SATURATION: 95 % | WEIGHT: 170 LBS

## 2024-08-13 DIAGNOSIS — R97.20 ELEVATED PSA: ICD-10-CM

## 2024-08-13 PROCEDURE — 99204 OFFICE O/P NEW MOD 45 MIN: CPT | Performed by: UROLOGY

## 2024-08-13 PROCEDURE — 3079F DIAST BP 80-89 MM HG: CPT | Performed by: UROLOGY

## 2024-08-13 PROCEDURE — 3074F SYST BP LT 130 MM HG: CPT | Performed by: UROLOGY

## 2024-08-13 ASSESSMENT — FIBROSIS 4 INDEX: FIB4 SCORE: 1.35

## 2024-08-20 ENCOUNTER — APPOINTMENT (OUTPATIENT)
Dept: RADIOLOGY | Facility: MEDICAL CENTER | Age: 69
End: 2024-08-20
Attending: FAMILY MEDICINE
Payer: COMMERCIAL

## 2024-10-02 ENCOUNTER — OFFICE VISIT (OUTPATIENT)
Dept: MEDICAL GROUP | Facility: LAB | Age: 69
End: 2024-10-02
Payer: COMMERCIAL

## 2024-10-02 VITALS
DIASTOLIC BLOOD PRESSURE: 62 MMHG | OXYGEN SATURATION: 94 % | HEIGHT: 70 IN | BODY MASS INDEX: 25.31 KG/M2 | SYSTOLIC BLOOD PRESSURE: 102 MMHG | WEIGHT: 176.8 LBS | HEART RATE: 72 BPM | TEMPERATURE: 98.1 F | RESPIRATION RATE: 16 BRPM

## 2024-10-02 DIAGNOSIS — R31.0 GROSS HEMATURIA: ICD-10-CM

## 2024-10-02 LAB
APPEARANCE UR: CLEAR
BILIRUB UR STRIP-MCNC: NORMAL MG/DL
COLOR UR AUTO: YELLOW
GLUCOSE UR STRIP.AUTO-MCNC: NORMAL MG/DL
KETONES UR STRIP.AUTO-MCNC: NORMAL MG/DL
LEUKOCYTE ESTERASE UR QL STRIP.AUTO: NORMAL
NITRITE UR QL STRIP.AUTO: NORMAL
PH UR STRIP.AUTO: 6 [PH] (ref 5–8)
PROT UR QL STRIP: NORMAL MG/DL
RBC UR QL AUTO: NORMAL
SP GR UR STRIP.AUTO: 1.01
UROBILINOGEN UR STRIP-MCNC: 0.2 MG/DL

## 2024-10-02 PROCEDURE — 3078F DIAST BP <80 MM HG: CPT | Performed by: FAMILY MEDICINE

## 2024-10-02 PROCEDURE — 99214 OFFICE O/P EST MOD 30 MIN: CPT | Performed by: FAMILY MEDICINE

## 2024-10-02 PROCEDURE — 81002 URINALYSIS NONAUTO W/O SCOPE: CPT | Performed by: FAMILY MEDICINE

## 2024-10-02 PROCEDURE — 3074F SYST BP LT 130 MM HG: CPT | Performed by: FAMILY MEDICINE

## 2024-10-02 ASSESSMENT — FIBROSIS 4 INDEX: FIB4 SCORE: 1.35

## 2024-10-02 ASSESSMENT — PATIENT HEALTH QUESTIONNAIRE - PHQ9: CLINICAL INTERPRETATION OF PHQ2 SCORE: 0

## 2024-10-07 ENCOUNTER — HOSPITAL ENCOUNTER (OUTPATIENT)
Dept: LAB | Facility: MEDICAL CENTER | Age: 69
End: 2024-10-07
Attending: FAMILY MEDICINE
Payer: COMMERCIAL

## 2024-10-07 DIAGNOSIS — R31.0 GROSS HEMATURIA: ICD-10-CM

## 2024-10-07 LAB
ANION GAP SERPL CALC-SCNC: 11 MMOL/L (ref 7–16)
BASOPHILS # BLD AUTO: 1.1 % (ref 0–1.8)
BASOPHILS # BLD: 0.08 K/UL (ref 0–0.12)
BUN SERPL-MCNC: 23 MG/DL (ref 8–22)
CALCIUM SERPL-MCNC: 9.4 MG/DL (ref 8.4–10.2)
CHLORIDE SERPL-SCNC: 103 MMOL/L (ref 96–112)
CO2 SERPL-SCNC: 24 MMOL/L (ref 20–33)
CREAT SERPL-MCNC: 0.92 MG/DL (ref 0.5–1.4)
EOSINOPHIL # BLD AUTO: 0.12 K/UL (ref 0–0.51)
EOSINOPHIL NFR BLD: 1.6 % (ref 0–6.9)
ERYTHROCYTE [DISTWIDTH] IN BLOOD BY AUTOMATED COUNT: 41.4 FL (ref 35.9–50)
GFR SERPLBLD CREATININE-BSD FMLA CKD-EPI: 89 ML/MIN/1.73 M 2
GLUCOSE SERPL-MCNC: 90 MG/DL (ref 65–99)
HCT VFR BLD AUTO: 44.6 % (ref 42–52)
HGB BLD-MCNC: 15.4 G/DL (ref 14–18)
IMM GRANULOCYTES # BLD AUTO: 0.08 K/UL (ref 0–0.11)
IMM GRANULOCYTES NFR BLD AUTO: 1.1 % (ref 0–0.9)
LYMPHOCYTES # BLD AUTO: 1.12 K/UL (ref 1–4.8)
LYMPHOCYTES NFR BLD: 15.2 % (ref 22–41)
MCH RBC QN AUTO: 32.3 PG (ref 27–33)
MCHC RBC AUTO-ENTMCNC: 34.5 G/DL (ref 32.3–36.5)
MCV RBC AUTO: 93.5 FL (ref 81.4–97.8)
MONOCYTES # BLD AUTO: 0.49 K/UL (ref 0–0.85)
MONOCYTES NFR BLD AUTO: 6.7 % (ref 0–13.4)
NEUTROPHILS # BLD AUTO: 5.46 K/UL (ref 1.82–7.42)
NEUTROPHILS NFR BLD: 74.3 % (ref 44–72)
NRBC # BLD AUTO: 0 K/UL
NRBC BLD-RTO: 0 /100 WBC (ref 0–0.2)
PLATELET # BLD AUTO: 228 K/UL (ref 164–446)
PMV BLD AUTO: 10.6 FL (ref 9–12.9)
POTASSIUM SERPL-SCNC: 4.5 MMOL/L (ref 3.6–5.5)
RBC # BLD AUTO: 4.77 M/UL (ref 4.7–6.1)
SODIUM SERPL-SCNC: 138 MMOL/L (ref 135–145)
WBC # BLD AUTO: 7.4 K/UL (ref 4.8–10.8)

## 2024-10-07 PROCEDURE — 85025 COMPLETE CBC W/AUTO DIFF WBC: CPT

## 2024-10-07 PROCEDURE — 36415 COLL VENOUS BLD VENIPUNCTURE: CPT

## 2024-10-07 PROCEDURE — 80048 BASIC METABOLIC PNL TOTAL CA: CPT

## 2024-10-12 ENCOUNTER — HOSPITAL ENCOUNTER (OUTPATIENT)
Dept: RADIOLOGY | Facility: MEDICAL CENTER | Age: 69
End: 2024-10-12
Attending: UROLOGY
Payer: COMMERCIAL

## 2024-10-12 DIAGNOSIS — R97.20 ELEVATED PSA: ICD-10-CM

## 2024-10-12 PROCEDURE — 700117 HCHG RX CONTRAST REV CODE 255: Mod: JZ | Performed by: UROLOGY

## 2024-10-12 PROCEDURE — 72197 MRI PELVIS W/O & W/DYE: CPT

## 2024-10-12 PROCEDURE — A9579 GAD-BASE MR CONTRAST NOS,1ML: HCPCS | Mod: JZ | Performed by: UROLOGY

## 2024-10-12 PROCEDURE — 700111 HCHG RX REV CODE 636 W/ 250 OVERRIDE (IP): Mod: JZ | Performed by: RADIOLOGY

## 2024-10-12 RX ADMIN — GADOTERIDOL 15 ML: 279.3 INJECTION, SOLUTION INTRAVENOUS at 09:00

## 2024-10-12 RX ADMIN — GLUCAGON 1 MG: 1 INJECTION, POWDER, LYOPHILIZED, FOR SOLUTION INTRAMUSCULAR; INTRAVENOUS at 07:20

## 2024-10-26 DIAGNOSIS — E78.5 DYSLIPIDEMIA: ICD-10-CM

## 2024-10-29 RX ORDER — ATORVASTATIN CALCIUM 20 MG/1
20 TABLET, FILM COATED ORAL DAILY
Qty: 90 TABLET | Refills: 3 | Status: SHIPPED | OUTPATIENT
Start: 2024-10-29

## 2024-12-17 ENCOUNTER — TELEPHONE (OUTPATIENT)
Dept: MEDICAL GROUP | Facility: LAB | Age: 69
End: 2024-12-17
Payer: COMMERCIAL

## 2024-12-17 NOTE — TELEPHONE ENCOUNTER
Patient came into the office requesting to have the ultrasound done for the groin area.  Patient states that he declined to have it done previously.    This issue was discussed w/ provider a couple of months ago.    Please contact patient #[phone]

## 2024-12-24 ENCOUNTER — HOSPITAL ENCOUNTER (OUTPATIENT)
Dept: RADIOLOGY | Facility: MEDICAL CENTER | Age: 69
End: 2024-12-24
Attending: FAMILY MEDICINE
Payer: COMMERCIAL

## 2024-12-24 DIAGNOSIS — R10.31 RIGHT GROIN PAIN: ICD-10-CM

## 2024-12-24 PROCEDURE — 76857 US EXAM PELVIC LIMITED: CPT

## 2024-12-31 ENCOUNTER — TELEPHONE (OUTPATIENT)
Dept: MEDICAL GROUP | Facility: LAB | Age: 69
End: 2024-12-31
Payer: COMMERCIAL

## 2024-12-31 NOTE — TELEPHONE ENCOUNTER
VOICEMAIL  1. Caller Name: Felipe Hutson                       Call Back Number: [phone]    2. Message: Patient states he recently did an ultrasound and now has severe pain and blood I his urine. Patient is still waiting for the results of his ultrasound as well as he said they would be given to him last Thursday.     3. Patient approves office to leave a detailed voicemail/Respihart message: N\A

## 2025-01-02 ENCOUNTER — HOSPITAL ENCOUNTER (OUTPATIENT)
Facility: MEDICAL CENTER | Age: 70
End: 2025-01-02
Attending: STUDENT IN AN ORGANIZED HEALTH CARE EDUCATION/TRAINING PROGRAM
Payer: COMMERCIAL

## 2025-01-02 ENCOUNTER — PATIENT MESSAGE (OUTPATIENT)
Dept: MEDICAL GROUP | Facility: LAB | Age: 70
End: 2025-01-02
Payer: COMMERCIAL

## 2025-01-02 ENCOUNTER — OFFICE VISIT (OUTPATIENT)
Dept: MEDICAL GROUP | Facility: LAB | Age: 70
End: 2025-01-02
Payer: COMMERCIAL

## 2025-01-02 VITALS
WEIGHT: 183 LBS | HEIGHT: 70 IN | TEMPERATURE: 98.3 F | HEART RATE: 74 BPM | BODY MASS INDEX: 26.2 KG/M2 | OXYGEN SATURATION: 97 % | RESPIRATION RATE: 16 BRPM | SYSTOLIC BLOOD PRESSURE: 110 MMHG | DIASTOLIC BLOOD PRESSURE: 64 MMHG

## 2025-01-02 DIAGNOSIS — K40.00 NON-RECURRENT BILATERAL INGUINAL HERNIA WITH OBSTRUCTION WITHOUT GANGRENE: ICD-10-CM

## 2025-01-02 DIAGNOSIS — R31.0 GROSS HEMATURIA: ICD-10-CM

## 2025-01-02 DIAGNOSIS — N40.1 BENIGN PROSTATIC HYPERPLASIA WITH LOWER URINARY TRACT SYMPTOMS, SYMPTOM DETAILS UNSPECIFIED: Chronic | ICD-10-CM

## 2025-01-02 PROBLEM — Z90.79 S/P TURP: Status: ACTIVE | Noted: 2025-01-02

## 2025-01-02 PROBLEM — R97.20 ELEVATED PSA, LESS THAN 10 NG/ML: Status: ACTIVE | Noted: 2025-01-02

## 2025-01-02 PROBLEM — K40.20 NON-RECURRENT BILATERAL INGUINAL HERNIA WITHOUT OBSTRUCTION OR GANGRENE: Status: ACTIVE | Noted: 2025-01-02

## 2025-01-02 LAB
APPEARANCE UR: CLEAR
BILIRUB UR STRIP-MCNC: NEGATIVE MG/DL
COLOR UR AUTO: YELLOW
GLUCOSE UR STRIP.AUTO-MCNC: NEGATIVE MG/DL
KETONES UR STRIP.AUTO-MCNC: NEGATIVE MG/DL
LEUKOCYTE ESTERASE UR QL STRIP.AUTO: NEGATIVE
NITRITE UR QL STRIP.AUTO: NEGATIVE
PH UR STRIP.AUTO: 6.5 [PH] (ref 5–8)
PROT UR QL STRIP: NEGATIVE MG/DL
RBC UR QL AUTO: NEGATIVE
SP GR UR STRIP.AUTO: 1.01
UROBILINOGEN UR STRIP-MCNC: 0.2 MG/DL

## 2025-01-02 PROCEDURE — 3074F SYST BP LT 130 MM HG: CPT | Performed by: STUDENT IN AN ORGANIZED HEALTH CARE EDUCATION/TRAINING PROGRAM

## 2025-01-02 PROCEDURE — 81002 URINALYSIS NONAUTO W/O SCOPE: CPT | Performed by: STUDENT IN AN ORGANIZED HEALTH CARE EDUCATION/TRAINING PROGRAM

## 2025-01-02 PROCEDURE — 87086 URINE CULTURE/COLONY COUNT: CPT

## 2025-01-02 PROCEDURE — 99214 OFFICE O/P EST MOD 30 MIN: CPT | Performed by: STUDENT IN AN ORGANIZED HEALTH CARE EDUCATION/TRAINING PROGRAM

## 2025-01-02 PROCEDURE — 3078F DIAST BP <80 MM HG: CPT | Performed by: STUDENT IN AN ORGANIZED HEALTH CARE EDUCATION/TRAINING PROGRAM

## 2025-01-02 ASSESSMENT — ENCOUNTER SYMPTOMS
CHILLS: 0
SHORTNESS OF BREATH: 0
VOMITING: 0
ABDOMINAL PAIN: 0
BACK PAIN: 1
COUGH: 0
NAUSEA: 0
FEVER: 0
FLANK PAIN: 0
DIARRHEA: 0
WEIGHT LOSS: 0

## 2025-01-02 ASSESSMENT — FIBROSIS 4 INDEX: FIB4 SCORE: 1.27

## 2025-01-02 ASSESSMENT — PATIENT HEALTH QUESTIONNAIRE - PHQ9: CLINICAL INTERPRETATION OF PHQ2 SCORE: 0

## 2025-01-02 NOTE — PROGRESS NOTES
Verbal consent was acquired by the patient to use [employer] ambient listening note generation during this visit Yes.    Subjective:     Chief Complaint   Patient presents with    Follow-Up     HPI:   PCP unavailable.    History of Present Illness  The patient is a 69-year-old male who presents for evaluation of hematuria and hernia.    He reports intermittent hematuria since September or October, which he attributes to a potential urinary tract infection (UTI).  He states that more often than not when he experiences the hematuria it is in the form of a small clot or string.  Denies dysuria but does report changes of his urinary urgency/frequency in the past 2 weeks.  He has not consulted with his urologist Dr. Nieto since the onset of his hematuria.  Denies abdominal pain. He reports no flank pain but does experience lower back pain, which he believes is due to increased mobility. He has never smoked.    Patient is status post TURP approximately 11 years ago.  His PSA ermias and he had decided to not proceed with a prostate biopsy so an MRI was obtained in October of last year.  He believes his PSA levels are elevated due to an abdominal injury sustained while lifting boulders last summer, which resulted in a hernia. He is currently not on any prostate medication, having been advised by Dr. Nieto to discontinue such treatment (finasteride).  He has been advised to monitor his PSA levels and schedule a follow-up visit in February.  He does not drink alcohol but admits to coffee in the mornings which does seem to make his urinary symptoms worse. See I-PSS in media.    He has a known inguinal hernia, which he believes may have enlarged due to exercise. He has been referred to a surgeon for further evaluation.    Review of Systems   Constitutional:  Negative for chills, fever, malaise/fatigue and weight loss.   Respiratory:  Negative for cough and shortness of breath.    Cardiovascular:  Negative for chest pain.  "  Gastrointestinal:  Negative for abdominal pain, diarrhea, nausea and vomiting.   Genitourinary:  Positive for frequency, hematuria and urgency. Negative for dysuria and flank pain.   Musculoskeletal:  Positive for back pain.   Skin:  Negative for rash.       Objective:     Exam:  /64 (BP Location: Right arm, Patient Position: Sitting, BP Cuff Size: Adult)   Pulse 74   Temp 36.8 °C (98.3 °F) (Temporal)   Resp 16   Ht 1.778 m (5' 10\")   Wt 83 kg (183 lb)   SpO2 97%   BMI 26.26 kg/m²  Body mass index is 26.26 kg/m².    Physical Exam  Vitals reviewed.   Constitutional:       Appearance: Normal appearance.   HENT:      Head: Normocephalic and atraumatic.      Mouth/Throat:      Mouth: Mucous membranes are moist.   Pulmonary:      Effort: Pulmonary effort is normal.   Abdominal:      General: Abdomen is flat. Bowel sounds are normal. There is no distension.      Palpations: Abdomen is soft. There is no mass.      Tenderness: There is no abdominal tenderness. There is no guarding or rebound.   Neurological:      General: No focal deficit present.      Mental Status: He is alert and oriented to person, place, and time.   Psychiatric:         Mood and Affect: Mood normal.         Behavior: Behavior normal.         Thought Content: Thought content normal.       Labs: Reviewed from 10/2/2024, 10/7/2024  Imaging: Reviewed from 10/12/2024, 12/24/2024    Assessment & Plan:     69 y.o. male with the following -     1. Gross hematuria  Chronic, intermittent. Inguinal hernias not likely cause unless there is associated bladder herniation. POC UA normal, will send for urine culture will be ordered to rule out a urinary tract infection (UTI). A CT scan will also be conducted to evaluate further. He has been advised to schedule an appointment with his urologist for follow up and consideration of cystoscopy if appropriate.  - POCT Urinalysis  - CT-ABDOMEN & PELVIS UROGRAM; Future  - URINE CULTURE(NEW); Future    2. Benign " prostatic hyperplasia with lower urinary tract symptoms, symptom details unspecified  Chronic s/p TURP 11yrs ago with elevated PSA monitored by urology. Declines trial of flomax for symptoms in favor or reducing caffeine intake which does contribute.  Continue care with urology.    I spent a total of 31 minutes with record review, exam, communication with the patient, and documentation of this encounter.    Return if symptoms worsen or fail to improve.    Please note that this dictation was created using voice recognition software. I have made every reasonable attempt to correct obvious errors, but I expect that there are errors of grammar and possibly content that I did not discover before finalizing the note.

## 2025-01-05 LAB
BACTERIA UR CULT: NORMAL
SIGNIFICANT IND 70042: NORMAL
SITE SITE: NORMAL
SOURCE SOURCE: NORMAL

## 2025-01-07 ENCOUNTER — TELEPHONE (OUTPATIENT)
Dept: UROLOGY | Facility: MEDICAL CENTER | Age: 70
End: 2025-01-07
Payer: COMMERCIAL

## 2025-01-07 ENCOUNTER — HOSPITAL ENCOUNTER (OUTPATIENT)
Dept: LAB | Facility: MEDICAL CENTER | Age: 70
End: 2025-01-07
Attending: STUDENT IN AN ORGANIZED HEALTH CARE EDUCATION/TRAINING PROGRAM
Payer: COMMERCIAL

## 2025-01-07 ENCOUNTER — TELEPHONE (OUTPATIENT)
Dept: UROLOGY | Facility: MEDICAL CENTER | Age: 70
End: 2025-01-07

## 2025-01-07 DIAGNOSIS — R31.0 GROSS HEMATURIA: ICD-10-CM

## 2025-01-07 LAB
ANION GAP SERPL CALC-SCNC: 13 MMOL/L (ref 7–16)
BUN SERPL-MCNC: 12 MG/DL (ref 8–22)
CALCIUM SERPL-MCNC: 9.5 MG/DL (ref 8.4–10.2)
CHLORIDE SERPL-SCNC: 101 MMOL/L (ref 96–112)
CO2 SERPL-SCNC: 24 MMOL/L (ref 20–33)
CREAT SERPL-MCNC: 1.01 MG/DL (ref 0.5–1.4)
FASTING STATUS PATIENT QL REPORTED: NORMAL
GFR SERPLBLD CREATININE-BSD FMLA CKD-EPI: 80 ML/MIN/1.73 M 2
GLUCOSE SERPL-MCNC: 87 MG/DL (ref 65–99)
POTASSIUM SERPL-SCNC: 4.1 MMOL/L (ref 3.6–5.5)
SODIUM SERPL-SCNC: 138 MMOL/L (ref 135–145)

## 2025-01-07 PROCEDURE — 80048 BASIC METABOLIC PNL TOTAL CA: CPT

## 2025-01-07 PROCEDURE — 36415 COLL VENOUS BLD VENIPUNCTURE: CPT

## 2025-01-08 ENCOUNTER — APPOINTMENT (OUTPATIENT)
Dept: RADIOLOGY | Facility: MEDICAL CENTER | Age: 70
End: 2025-01-08
Attending: EMERGENCY MEDICINE
Payer: COMMERCIAL

## 2025-01-08 ENCOUNTER — HOSPITAL ENCOUNTER (EMERGENCY)
Facility: MEDICAL CENTER | Age: 70
End: 2025-01-08
Attending: EMERGENCY MEDICINE
Payer: COMMERCIAL

## 2025-01-08 VITALS
DIASTOLIC BLOOD PRESSURE: 76 MMHG | WEIGHT: 179.9 LBS | SYSTOLIC BLOOD PRESSURE: 137 MMHG | HEIGHT: 70 IN | HEART RATE: 71 BPM | TEMPERATURE: 97.1 F | BODY MASS INDEX: 25.75 KG/M2 | RESPIRATION RATE: 18 BRPM | OXYGEN SATURATION: 97 %

## 2025-01-08 DIAGNOSIS — R31.9 HEMATURIA, UNSPECIFIED TYPE: ICD-10-CM

## 2025-01-08 DIAGNOSIS — N20.0 NEPHROLITHIASIS: ICD-10-CM

## 2025-01-08 LAB
APPEARANCE UR: CLEAR
BACTERIA #/AREA URNS HPF: ABNORMAL /HPF
BILIRUB UR QL STRIP.AUTO: NEGATIVE
CASTS URNS QL MICRO: ABNORMAL /LPF (ref 0–2)
COLOR UR: YELLOW
EPITHELIAL CELLS 1715: ABNORMAL /HPF (ref 0–5)
GLUCOSE UR STRIP.AUTO-MCNC: NEGATIVE MG/DL
KETONES UR STRIP.AUTO-MCNC: NEGATIVE MG/DL
LEUKOCYTE ESTERASE UR QL STRIP.AUTO: NEGATIVE
MICRO URNS: ABNORMAL
NITRITE UR QL STRIP.AUTO: NEGATIVE
PH UR STRIP.AUTO: 6 [PH] (ref 5–8)
PROT UR QL STRIP: NEGATIVE MG/DL
RBC # URNS HPF: ABNORMAL /HPF
RBC UR QL AUTO: ABNORMAL
SP GR UR STRIP.AUTO: 1.02
WBC #/AREA URNS HPF: ABNORMAL /HPF

## 2025-01-08 PROCEDURE — 81001 URINALYSIS AUTO W/SCOPE: CPT

## 2025-01-08 PROCEDURE — 99283 EMERGENCY DEPT VISIT LOW MDM: CPT

## 2025-01-08 PROCEDURE — 74176 CT ABD & PELVIS W/O CONTRAST: CPT

## 2025-01-08 ASSESSMENT — FIBROSIS 4 INDEX: FIB4 SCORE: 1.27

## 2025-01-08 NOTE — ED NOTES
D/c pt home, no rx given . Pt aware of f/u instructions with PMD and urology , aware to return for any changes or concerns. No further questions upon d/c home from ed

## 2025-01-08 NOTE — ED PROVIDER NOTES
ER Provider Note    Scribed for Kingsley Vallejo M.D. by Neil Muñiz. 1/8/2025   7:46 AM    Primary Care Provider: Reza Sunshine M.D.    CHIEF COMPLAINT  Chief Complaint   Patient presents with    Blood in Urine     Patient has had increased blood in urine the past few days. Patient came in regarding concern for this. Followed by urology outpatient CT scheduled.      EXTERNAL RECORDS REVIEWED  Outpatient Notes Patient had a recent workup performed for hematuria and his metabolic panel yesterday was normal. He had a UA performed six days ago that was negative.       HPI/ROS  LIMITATION TO HISTORY   Select: : None  OUTSIDE HISTORIAN(S):  None.     Felipe Hutson is a 69 y.o. male who presents to the ED for evaluation of worsening hematuria onset two weeks ago. Patient reports that he was having intermittent hematuria in October of 2024, and since Christmas he has had more frequent hematuria. He saw Dr. Castillo, his new primary care provider, four days ago where he had labs performed. He was told to present to the ED if he had worsening bleeding, and arrives today after reporting increased blood in his urine. Patient denies any dysuria or fever. He had a CT ordered at his last visit to evaluate.     PAST MEDICAL HISTORY  Past Medical History:   Diagnosis Date    BPH 6/10/2009       SURGICAL HISTORY  Past Surgical History:   Procedure Laterality Date    HYDROCELECTOMY ADULT      TURP-VAPOR N/A        FAMILY HISTORY  Family History   Problem Relation Age of Onset    Hyperlipidemia Father     Cancer Father 99        lung    Hyperlipidemia Maternal Grandmother     Heart Disease Maternal Grandmother        SOCIAL HISTORY   reports that he has never smoked. He has never used smokeless tobacco. He reports that he does not drink alcohol and does not use drugs.    CURRENT MEDICATIONS  Previous Medications    ATORVASTATIN (LIPITOR) 20 MG TAB    TAKE 1 TABLET BY MOUTH EVERY DAY    CHOLECALCIFEROL (VITAMIN D)  "50 MCG (2000 UT) CAP    Take  by mouth. Only takes during the winter season    IBUPROFEN (MOTRIN) 200 MG TAB    Take 200 mg by mouth every 6 hours as needed.       ALLERGIES  No Known Allergies     PHYSICAL EXAM  BP (!) 140/80   Pulse 74   Temp 36.2 °C (97.1 °F) (Temporal)   Resp 15   Ht 1.778 m (5' 10\")   Wt 81.6 kg (179 lb 14.3 oz)   SpO2 96%   BMI 25.81 kg/m²    Nursing note and vitals reviewed.  Constitutional: Well-developed and well-nourished. No distress.   HENT: Head is normocephalic and atraumatic. Oropharynx is clear and moist without exudate or erythema.   Eyes: Pupils are equal, round, and reactive to light. Conjunctiva are normal.   Cardiovascular: Normal rate and regular rhythm. No murmur heard. Normal radial pulses.  Pulmonary/Chest: Breath sounds normal. No wheezes or rales.   Abdominal: Soft and non-tender. No distention    Musculoskeletal: Extremities exhibit normal range of motion without edema or tenderness.   Neurological: Awake, alert and oriented to person, place, and time. No focal deficits noted.  Skin: Skin is warm and dry. No rash.   Psychiatric: Normal mood and affect. Appropriate for clinical situation    DIAGNOSTIC STUDIES    Labs:   Results for orders placed or performed during the hospital encounter of 01/08/25   URINALYSIS    Collection Time: 01/08/25  9:49 AM    Specimen: Urine, Clean Catch   Result Value Ref Range    Color Yellow     Character Clear     Specific Gravity 1.020 <1.035    Ph 6.0 5.0 - 8.0    Glucose Negative Negative mg/dL    Ketones Negative Negative mg/dL    Protein Negative Negative mg/dL    Bilirubin Negative Negative    Nitrite Negative Negative    Leukocyte Esterase Negative Negative    Occult Blood Small (A) Negative    Micro Urine Req Microscopic    URINE MICROSCOPIC (W/UA)    Collection Time: 01/08/25  9:49 AM   Result Value Ref Range    WBC 0-2 /hpf    RBC 3-5 (A) /hpf    Bacteria None None /hpf    Epithelial Cells 0-2 0 - 5 /hpf    Urine Casts 0-2 0 " - 2 /lpf       Radiology:   This attending emergency physician has independently interpreted the diagnostic imaging associated with this visit and is awaiting the final reading from the radiologist.   Preliminary interpretation is a follows: CT scan demonstrates nephrolithiasis.  No obstructive changes.    Radiologist interpretation:   CT-RENAL COLIC EVALUATION(A/P W/O)   Final Result         1. Hepatomegaly. There are subtle low-density lesions in the superior aspect of the liver which are indeterminate on this exam.   2. Atherosclerosis including coronary artery disease.   3. Tiny renal calculi. There is no hydronephrosis.   4. Enlarged prostate.   5. Diverticulosis.           ASSESSMENT AND PLAN    7:46 AM - Patient was evaluated at bedside. Patient arrives today with worsening hematuria. He has followed up with his primary care for this problem, but has not yet seen Urology. Ordered for UA, and CT-Renal colic evaluation to evaluate. Patient verbalizes understanding and support with my plan of care.  Differential diagnoses include but not limited to: UTI, Kidney stone, Malignancy.     10:07 AM - I reevaluated the patient at bedside. I discussed the patient's diagnostic study results and informed him of the importance of follow up with urology given his renal problems. I discussed plan for discharge and follow up as outlined below. The patient is stable for discharge at this time and will return for any new or worsening symptoms. Patient verbalizes understanding and support with my plan for discharge.      DISPOSITION AND DISCUSSIONS    I have discussed management of the patient with the following physicians and MARY KATE's:  None.     Discussion of management with other Hospitals in [place] or appropriate source(s): None     Escalation of care considered, and ultimately not performed: acute inpatient care management, however at this time, the patient is most appropriate for outpatient management.    Barriers to care at this time,  including but not limited to:  None are known .     Decision tools and prescription drugs considered including, but not limited to:  None.  .     The patient will return for new or worsening symptoms and is stable at the time of discharge.    DISPOSITION:  Patient will be discharged home in stable condition.    FOLLOW UP:  Reza Sunshine M.D.  [address]  [place] [address] [phone]    Schedule an appointment as soon as possible for a visit       [facility], Emergency Dept  [address]  [facility] [address] [phone]    If symptoms worsen    Jose Nieto M.D.  [address]  [place] [address] [phone]    Schedule an appointment as soon as possible for a visit         FINAL DIAGNOSIS  1. Hematuria, unspecified type    2. Nephrolithiasis         Neil SERRANO (Scribdharmesh), am scribing for, and in the presence of, Kingsley Vallejo M.D..    Electronically signed by: Neil Muñiz (Scribe), 1/8/2025    Kingsley SERRANO M.D. personally performed the services described in this documentation, as scribed by Neil Muñiz in my presence, and it is both accurate and complete.      The note accurately reflects work and decisions made by me.  Kingsley Vallejo M.D.  1/8/2025  12:44 PM

## 2025-01-08 NOTE — ED NOTES
"PT states he has \"bilateral inguinal hernia\" \"two wholes in his abdomen. Pt has history of enlarged prostate. PT reports blood in his urine. PT reports physical activity enhances the blood in urine. PT states there is less when he is relaxing. PT reports pain in the lower abdomen and back and a headache. PT also reports frequent urination.   "

## 2025-01-08 NOTE — ED TRIAGE NOTES
"Colleen presents to the ER with the following complaints:    Chief Complaint   Patient presents with    Blood in Urine     Patient has had increased blood in urine the past few days. Patient came in regarding concern for this. Followed by urology outpatient CT scheduled.        BP (!) 140/80   Pulse 74   Temp 36.2 °C (97.1 °F) (Temporal)   Resp 15   Ht 1.778 m (5' 10\")   Wt 81.6 kg (179 lb 14.3 oz)   SpO2 96%   BMI 25.81 kg/m²       "

## 2025-01-10 ENCOUNTER — APPOINTMENT (OUTPATIENT)
Dept: RADIOLOGY | Facility: MEDICAL CENTER | Age: 70
End: 2025-01-10
Attending: STUDENT IN AN ORGANIZED HEALTH CARE EDUCATION/TRAINING PROGRAM
Payer: COMMERCIAL

## 2025-01-13 ENCOUNTER — OFFICE VISIT (OUTPATIENT)
Dept: SURGICAL ONCOLOGY | Facility: MEDICAL CENTER | Age: 70
End: 2025-01-13
Payer: COMMERCIAL

## 2025-01-13 VITALS
SYSTOLIC BLOOD PRESSURE: 144 MMHG | HEIGHT: 70 IN | HEART RATE: 109 BPM | TEMPERATURE: 98.4 F | OXYGEN SATURATION: 98 % | DIASTOLIC BLOOD PRESSURE: 90 MMHG | BODY MASS INDEX: 25.62 KG/M2 | WEIGHT: 179 LBS

## 2025-01-13 DIAGNOSIS — K40.90 NON-RECURRENT INGUINAL HERNIA WITHOUT OBSTRUCTION OR GANGRENE, UNSPECIFIED LATERALITY: ICD-10-CM

## 2025-01-13 PROCEDURE — 3080F DIAST BP >= 90 MM HG: CPT | Performed by: SURGERY

## 2025-01-13 PROCEDURE — 99203 OFFICE O/P NEW LOW 30 MIN: CPT | Performed by: SURGERY

## 2025-01-13 PROCEDURE — 3077F SYST BP >= 140 MM HG: CPT | Performed by: SURGERY

## 2025-01-13 ASSESSMENT — FIBROSIS 4 INDEX: FIB4 SCORE: 1.27

## 2025-01-13 NOTE — PROGRESS NOTES
Subjective:   1/13/2025 11:22 AM  Primary care physician:Reza Sunshine M.D.      Chief Complaint:   Chief Complaint   Patient presents with    New Patient     Non-recurrent bilateral inguinal hernia with obstruction without gangrene       History of presenting illness:  Felipe Hutson  is a pleasant 69 y.o. year old male who presented with a painful bulge int he right groin.  This has bene bothering him for over a year now but is getting increasingly painful over time.  He denies any GI symptoms associated with this hernia.  Recent imaging suggests there may be a left-sided hernia as well, although he denies symptoms on this side.  He does not take any blood thinners or steroids at this time.  He had a hydrocele removed 52 years ago when he was 17.          Past Medical History:   Diagnosis Date    BPH 6/10/2009     Past Surgical History:   Procedure Laterality Date    HYDROCELECTOMY ADULT      TURP-VAPOR N/A      No Known Allergies  Current Outpatient Medications   Medication Sig    atorvastatin (LIPITOR) 20 MG Tab TAKE 1 TABLET BY MOUTH EVERY DAY    ibuprofen (MOTRIN) 200 MG Tab Take 200 mg by mouth every 6 hours as needed.    Cholecalciferol (VITAMIN D) 50 MCG (2000 UT) Cap Take  by mouth. Only takes during the winter season     Social History     Socioeconomic History    Marital status:      Spouse name: Not on file    Number of children: Not on file    Years of education: Not on file    Highest education level: Not on file   Occupational History    Not on file   Tobacco Use    Smoking status: Never    Smokeless tobacco: Never   Vaping Use    Vaping status: Never Used   Substance and Sexual Activity    Alcohol use: No     Comment: rare    Drug use: No    Sexual activity: Not Currently   Other Topics Concern    Not on file   Social History Narrative    ** Merged History Encounter **          Social Drivers of Health     Financial Resource Strain: Not on file   Food Insecurity: Not on file  "  Transportation Needs: Not on file   Physical Activity: Not on file   Stress: Not on file   Social Connections: Not on file   Intimate Partner Violence: Not on file   Housing Stability: Not on file      Family History   Problem Relation Age of Onset    Hyperlipidemia Father     Cancer Father 99        lung    Hyperlipidemia Maternal Grandmother     Heart Disease Maternal Grandmother        ROS: Negative except as detailed in HPI.     Objective:   BP (!) 144/90 (BP Location: Left arm, Patient Position: Sitting, BP Cuff Size: Adult)   Pulse (!) 109   Temp 36.9 °C (98.4 °F) (Temporal)   Ht 1.778 m (5' 10\")   Wt 81.2 kg (179 lb)   SpO2 98%   BMI 25.68 kg/m²     Physical Exam:  Constitutional: Well-developed and well-nourished. Not diaphoretic. No distress.   Skin: Skin is warm and dry. No rash noted.  Head: Atraumatic without lesions.  Eyes: Conjunctivae and extraocular motions are normal. No scleral icterus.   Nose: Nares patent. Septum midline. No discharge.   Neck: Supple, trachea midline. Normal range of motion. No thyromegaly present. No lymphadenopathy--cervical or supraclavicular.  Cardiovascular: Regular rate and rhythm, 2+ pulses   Lungs: Effort normal. No chest wall masses.   Abdomen: Soft, non tender, and without distention. No rebound, guarding, masses.  Reducible small right inguinal hernia, no left inguinal hernia on exam  Extremities: Warm and well perfused, no pitting edema  Musculoskeletal: All major joints AROM full in all directions without pain.  Neurological: Alert and oriented x 3.   Psychiatric:  Behavior, mood, and affect are appropriate.          Diagnosis:     Assessment & Plan  Non-recurrent inguinal hernia without obstruction or gangrene, unspecified laterality               Medical Decision Making:  Today's Assessment / Status / Plan:     69y Mw with reducible right inguinal hernia.  We discussed repair with a minimally invasive approach.  I don't appreciate a hernia on the left, but " will evaluate this side at the time of surgery and do a bilateral repair if indicated.  Risks/benefits/alternatives of planned surgery were discussed with the patient.  They understand issues involved and agree with the surgical treatment plan.  Will arrange for surgery at the next available time.    Robotic IHR- Right, possible Bilateral  Supine

## 2025-01-13 NOTE — PATIENT INSTRUCTIONS
No Eating after midnight the night before surgery.  After surgery, you will be discharged home.  You will need a ride home from the hospital.  You may eat and drink a normal diet after surgery.  It is ok to go for walks and go up and down stairs.  NO LIFTING, MOVING OR CARRYING MORE THAN 10LBS for 4 weeks post-op.  Ok to shower with wounds uncovered after surgery.  You may return to work 5-7 days after surgery on light duty.  No lifting or straining as above.

## 2025-01-15 ENCOUNTER — APPOINTMENT (OUTPATIENT)
Dept: ADMISSIONS | Facility: MEDICAL CENTER | Age: 70
End: 2025-01-15
Attending: SURGERY
Payer: COMMERCIAL

## 2025-01-15 NOTE — PROGRESS NOTES
Subjective  Felipe Hutson is a 69 y.o. male who presents today for follow up elevated PSA.  Likely around 13 with his finasteride 2.5mg dose.  In order to better risk stratify his prostate cancer I recommended a multiparametric MRI.    IMPRESSION:  1. A somewhat wedge-shaped lesion in the left peripheral mid gland, possibly sequela of prostatitis PI-RADS 3. Consider follow-up.  2. No extracapsular disease.  3. BPH, with gland volume of 76.5 cc.  4. No pelvic lymphadenopathy or suspicious osseous lesions.     Total PI-RADS score: 3 - Intermediate (the presence of clinically significant cancer is equivocal)  A 4K score would not be indicated since he is on finasteride. I advised him to discontinue finasteride and he agrees. Will RTC in 1 month after mpMRI.   He had a TURP in 2011 for recurrent prostatitis. Was in the ED for gross hematuria in early January 2024. CT just small renal calculi.  He presents for follow up discussion.     Family History   Problem Relation Age of Onset    Hyperlipidemia Father     Cancer Father 99        lung    Hyperlipidemia Maternal Grandmother     Heart Disease Maternal Grandmother        Social History     Socioeconomic History    Marital status:    Tobacco Use    Smoking status: Never    Smokeless tobacco: Never   Vaping Use    Vaping status: Never Used   Substance and Sexual Activity    Alcohol use: No     Comment: rare    Drug use: No    Sexual activity: Not Currently   Social History Narrative    ** Merged History Encounter **            Past Surgical History:   Procedure Laterality Date    HYDROCELECTOMY ADULT      TURP-VAPOR N/A        Past Medical History:   Diagnosis Date    BPH 6/10/2009       Current Outpatient Medications   Medication Sig    atorvastatin (LIPITOR) 20 MG Tab TAKE 1 TABLET BY MOUTH EVERY DAY    ibuprofen (MOTRIN) 200 MG Tab Take 200 mg by mouth every 6 hours as needed.    Cholecalciferol (VITAMIN D) 50 MCG (2000 UT) Cap Take  by mouth. Only takes  during the winter season       No Known Allergies    Objective  There were no vitals taken for this visit.  Physical Exam      Labs:     POC UA  Lab Results   Component Value Date/Time    POCCOLOR Yellow 01/02/2025 04:43 PM    POCAPPEAR Clear 01/02/2025 04:43 PM    POCLEUKEST Negative 01/02/2025 04:43 PM    POCNITRITE Negative 01/02/2025 04:43 PM    POCUROBILIGE 0.2 01/02/2025 04:43 PM    POCPROTEIN Negative 01/02/2025 04:43 PM    POCURPH 6.5 01/02/2025 04:43 PM    POCBLOOD Negative 01/02/2025 04:43 PM    POCSPGRV 1.010 01/02/2025 04:43 PM    POCKETONES Negative 01/02/2025 04:43 PM    POCBILIRUBIN Negative 01/02/2025 04:43 PM    POCGLUCUA Negative 01/02/2025 04:43 PM        CMP  Lab Results   Component Value Date/Time    SODIUM 138 01/07/2025 1432    POTASSIUM 4.1 01/07/2025 1432    CHLORIDE 101 01/07/2025 1432    CO2 24 01/07/2025 1432    ANION 13.0 01/07/2025 1432    GLUCOSE 87 01/07/2025 1432    BUN 12 01/07/2025 1432    CREATININE 1.01 01/07/2025 1432    GFRCKD 80 01/07/2025 1432    CALCIUM 9.5 01/07/2025 1432    CORRCALC 9.2 08/02/2024 0754    ASTSGOT 16 08/02/2024 0754    ALTSGPT 15 08/02/2024 0754    ALKPHOSPHAT 55 08/02/2024 0754    TBILIRUBIN 0.5 08/02/2024 0754    ALBUMIN 4.4 08/02/2024 0754    TOTPROTEIN 7.4 08/02/2024 0754    GLOBULIN 3.0 08/02/2024 0754    AGRATIO 1.5 08/02/2024 0754       BMP  Lab Results   Component Value Date/Time    SODIUM 138 01/07/2025 1432    POTASSIUM 4.1 01/07/2025 1432    CHLORIDE 101 01/07/2025 1432    CO2 24 01/07/2025 1432    GLUCOSE 87 01/07/2025 1432    BUN 12 01/07/2025 1432    CREATININE 1.01 01/07/2025 1432    CALCIUM 9.5 01/07/2025 1432       CBC  Lab Results   Component Value Date/Time    WBC 7.4 10/07/2024 0806    RBC 4.77 10/07/2024 0806    HEMOGLOBIN 15.4 10/07/2024 0806    HEMATOCRIT 44.6 10/07/2024 0806    MCV 93.5 10/07/2024 0806    MCH 32.3 10/07/2024 0806    MCHC 34.5 10/07/2024 0806    RDW 41.4 10/07/2024 0806    MPV 10.6 10/07/2024 0806    LYMPHOCYTES 15.20  (L) 10/07/2024 0806    LYMPHS 1.12 10/07/2024 0806    MONOCYTES 6.70 10/07/2024 0806    MONOS 0.49 10/07/2024 0806    EOSINOPHILS 1.60 10/07/2024 0806    EOS 0.12 10/07/2024 0806    BASOPHILS 1.10 10/07/2024 0806    BASO 0.08 10/07/2024 0806    NRBC 0.00 10/07/2024 0806       A1C  Lab Results   Component Value Date/Time    HBA1C 5.3 03/16/2022 0712    AVGLUC 105 03/16/2022 0712       Imaging:   MRI PROSTATE   MR-RECTAL/PROSTATE PROTOCOL 10/12/2024    Narrative  10/12/2024 7:31 AM    HISTORY/REASON FOR EXAM:  Elevated PSA    TECHNIQUE/EXAM DESCRIPTION:  MRI of the prostate without and with contrast.    The study was performed on a Caron 3.0 Tiarra MRI scanner.    Sagittal and axial T2; oblique high resolution axial and coronal T2; oblique axial diffusion-weighted imaging and ADC map with B values of 100, 400, and 1000; oblique axial dynamic postcontrast images of the prostate; postcontrast T1 fat-sat sequence of  the pelvis.    1 mg of glucagon was administered intravenously prior to the exam.    15 mL ProHance contrast was administered intravenously.    COMPARISON: None.    FINDINGS:  The prostate measures 5.8 x 4.5 x 5.6 cm with a volume of 76.5 cc.    Peripheral zone: No hemorrhage. Several wedge-shaped and linear areas of mild to moderate T2 hypointensity, consistent with sequela of prostatitis.    Transitional zone: Multiple stromal and glandular nodules of BPH.    Prostate lesions:  Lesion #1:  Location: Left peripheral midgland (PZpm)  Size: 1 cm on the T2-weighted images and 0.8 cm on the ADC map.  Capsular involvement: No extracapsular extension. No seminal vesicle invasion.  DWI/ADC score: 3  T2 score: 3  Enhancement: Equivocal  Total PI-RADS score: 3    Prostate capsule: Intact.  Seminal vesicles: Normal signal.    The bladder is normal in appearance.    No adenopathy is identified.    No suspicious bony lesions are identified.    Small fat-containing right inguinal hernia.    Impression  1. A somewhat  wedge-shaped lesion in the left peripheral mid gland, possibly sequela of prostatitis PI-RADS 3. Consider follow-up.  2. No extracapsular disease.  3. BPH, with gland volume of 76.5 cc.  4. No pelvic lymphadenopathy or suspicious osseous lesions.    Total PI-RADS score: 3 - Intermediate (the presence of clinically significant cancer is equivocal)        Assessment & Plan    Follow up elevated PSA and non-suspicious MRI.   Gross hematuria. CT without tumors. Will do cysto today.       Jose Nieto M.D., F.A.C.S.  Urologic Oncology  Vice-Chair, Department of Surgery  [facility]

## 2025-01-21 ENCOUNTER — OFFICE VISIT (OUTPATIENT)
Dept: UROLOGY | Facility: MEDICAL CENTER | Age: 70
End: 2025-01-21
Payer: COMMERCIAL

## 2025-01-21 DIAGNOSIS — R97.20 ELEVATED PSA: ICD-10-CM

## 2025-01-21 PROCEDURE — 99213 OFFICE O/P EST LOW 20 MIN: CPT | Mod: 25 | Performed by: UROLOGY

## 2025-01-21 PROCEDURE — 52000 CYSTOURETHROSCOPY: CPT | Performed by: UROLOGY

## 2025-01-21 NOTE — PROGRESS NOTES
Subjective  Felipe Hutson is a 69 y.o. male who presents today for cystoscopy to evaluate Gross Hematuria.     Family History   Problem Relation Age of Onset    Hyperlipidemia Father     Cancer Father 99        lung    Hyperlipidemia Maternal Grandmother     Heart Disease Maternal Grandmother        Social History     Socioeconomic History    Marital status:    Tobacco Use    Smoking status: Never    Smokeless tobacco: Never   Vaping Use    Vaping status: Never Used   Substance and Sexual Activity    Alcohol use: No     Comment: rare    Drug use: No    Sexual activity: Not Currently   Social History Narrative    ** Merged History Encounter **            Past Surgical History:   Procedure Laterality Date    HYDROCELECTOMY ADULT      TURP-VAPOR N/A        Past Medical History:   Diagnosis Date    BPH 6/10/2009       Current Outpatient Medications   Medication Sig    atorvastatin (LIPITOR) 20 MG Tab TAKE 1 TABLET BY MOUTH EVERY DAY    ibuprofen (MOTRIN) 200 MG Tab Take 200 mg by mouth every 6 hours as needed.    Cholecalciferol (VITAMIN D) 50 MCG (2000 UT) Cap Take  by mouth. Only takes during the winter season       No Known Allergies    Objective  There were no vitals taken for this visit.  Physical Exam      Labs:     POC UA  Lab Results   Component Value Date/Time    POCCOLOR Yellow 01/02/2025 04:43 PM    POCAPPEAR Clear 01/02/2025 04:43 PM    POCLEUKEST Negative 01/02/2025 04:43 PM    POCNITRITE Negative 01/02/2025 04:43 PM    POCUROBILIGE 0.2 01/02/2025 04:43 PM    POCPROTEIN Negative 01/02/2025 04:43 PM    POCURPH 6.5 01/02/2025 04:43 PM    POCBLOOD Negative 01/02/2025 04:43 PM    POCSPGRV 1.010 01/02/2025 04:43 PM    POCKETONES Negative 01/02/2025 04:43 PM    POCBILIRUBIN Negative 01/02/2025 04:43 PM    POCGLUCUA Negative 01/02/2025 04:43 PM        CMP   Lab Results   Component Value Date/Time    SODIUM 138 01/07/2025 1432    POTASSIUM 4.1 01/07/2025 1432    CHLORIDE 101 01/07/2025 1432    CO2  24 01/07/2025 1432    ANION 13.0 01/07/2025 1432    GLUCOSE 87 01/07/2025 1432    BUN 12 01/07/2025 1432    CREATININE 1.01 01/07/2025 1432    GFRCKD 80 01/07/2025 1432    CALCIUM 9.5 01/07/2025 1432    CORRCALC 9.2 08/02/2024 0754    ASTSGOT 16 08/02/2024 0754    ALTSGPT 15 08/02/2024 0754    ALKPHOSPHAT 55 08/02/2024 0754    TBILIRUBIN 0.5 08/02/2024 0754    ALBUMIN 4.4 08/02/2024 0754    TOTPROTEIN 7.4 08/02/2024 0754    GLOBULIN 3.0 08/02/2024 0754    AGRATIO 1.5 08/02/2024 0754       BMP  Lab Results   Component Value Date/Time    SODIUM 138 01/07/2025 1432    POTASSIUM 4.1 01/07/2025 1432    CHLORIDE 101 01/07/2025 1432    CO2 24 01/07/2025 1432    GLUCOSE 87 01/07/2025 1432    BUN 12 01/07/2025 1432    CREATININE 1.01 01/07/2025 1432    CALCIUM 9.5 01/07/2025 1432       CBC  Lab Results   Component Value Date/Time    WBC 7.4 10/07/2024 0806    RBC 4.77 10/07/2024 0806    HEMOGLOBIN 15.4 10/07/2024 0806    HEMATOCRIT 44.6 10/07/2024 0806    MCV 93.5 10/07/2024 0806    MCH 32.3 10/07/2024 0806    MCHC 34.5 10/07/2024 0806    RDW 41.4 10/07/2024 0806    MPV 10.6 10/07/2024 0806    LYMPHOCYTES 15.20 (L) 10/07/2024 0806    LYMPHS 1.12 10/07/2024 0806    MONOCYTES 6.70 10/07/2024 0806    MONOS 0.49 10/07/2024 0806    EOSINOPHILS 1.60 10/07/2024 0806    EOS 0.12 10/07/2024 0806    BASOPHILS 1.10 10/07/2024 0806    BASO 0.08 10/07/2024 0806    NRBC 0.00 10/07/2024 0806       A1C  Lab Results   Component Value Date/Time    HBA1C 5.3 03/16/2022 0712    AVGLUC 105 03/16/2022 0712         Procedure    Procedure performed: Cystoscopy     Surgeon: Dr. Petr Nieto    Indications For Procedure: Gross Hematuria    Blood Loss: 0 cc     Anesthesia: Lidocaine jelly     Specimen: None     Findings:     1. Urethra: no lesions or masses    2. Bladder: no lesions or masses    3. Bilateral ureteral jets observed with clear efflux      4. Prostate: moderate lateral lobe hypertrophy, no median lobe, 5 cm prostate length. Bleed of  prostatic origin.    Description of Procedure: The patient was prepped and draped in the usual sterile fashion.  A 17Fr flexible cystoscope was advanced along the urethra into the bladder under direct vision.  We performed a thorough cystoscopic evaluation patient's bladder including retroflexion, findings noted above.  We removed the cystoscope under direct vision to evaluate the urethra and prostate, findings noted above.  This concluded the procedure, the patient tolerated it well.     Assessment  Patient was instructed to call our office if he develops any signs of infection including increased frequency, urgency, dysuria, fever, or chills.  We discussed the results of the cystoscopy with the patient, all questions and concerns addressed.     Plan  Bleeding of prostatic origin. Off finasteride currently. Will repeat PSA in 6 weeks and see back in 2 months.     Jose Nieto M.D., F.A.C.S.  Urologic Oncology  Vice-Chair, Department of Surgery  [facility]

## 2025-01-22 ENCOUNTER — PRE-ADMISSION TESTING (OUTPATIENT)
Dept: ADMISSIONS | Facility: MEDICAL CENTER | Age: 70
End: 2025-01-22
Attending: SURGERY
Payer: COMMERCIAL

## 2025-01-22 NOTE — PREADMIT AVS NOTE
Current Medications   Medication Instructions    atorvastatin (LIPITOR) 20 MG Tab CONTINUE TAKING MEDICATION AS PRESCRIBED.     ibuprofen (MOTRIN) 200 MG Tab HOLD 5 DAYS PRIOR TO SURGERY, UNLESS INSTRUCTED BY SURGEON.    Cholecalciferol (VITAMIN D) 50 MCG (2000 UT) Cap HOLD 7 DAYS PRIOR TO SURGERY/PROCEDURE.

## 2025-01-24 ENCOUNTER — APPOINTMENT (OUTPATIENT)
Dept: ADMISSIONS | Facility: MEDICAL CENTER | Age: 70
End: 2025-01-24
Attending: SURGERY
Payer: COMMERCIAL

## 2025-01-24 DIAGNOSIS — Z01.810 PRE-OPERATIVE CARDIOVASCULAR EXAMINATION: ICD-10-CM

## 2025-01-24 DIAGNOSIS — Z01.812 PRE-OPERATIVE LABORATORY EXAMINATION: ICD-10-CM

## 2025-01-24 LAB — EKG IMPRESSION: NORMAL

## 2025-01-24 PROCEDURE — 93010 ELECTROCARDIOGRAM REPORT: CPT | Performed by: INTERNAL MEDICINE

## 2025-01-24 PROCEDURE — 93005 ELECTROCARDIOGRAM TRACING: CPT | Mod: TC

## 2025-01-27 ENCOUNTER — PRE-ADMISSION TESTING (OUTPATIENT)
Dept: ADMISSIONS | Facility: MEDICAL CENTER | Age: 70
End: 2025-01-27
Attending: SURGERY
Payer: COMMERCIAL

## 2025-01-27 DIAGNOSIS — Z01.812 PRE-OPERATIVE LABORATORY EXAMINATION: ICD-10-CM

## 2025-01-27 LAB
ERYTHROCYTE [DISTWIDTH] IN BLOOD BY AUTOMATED COUNT: 41.1 FL (ref 35.9–50)
HCT VFR BLD AUTO: 40.8 % (ref 42–52)
HGB BLD-MCNC: 13.9 G/DL (ref 14–18)
MCH RBC QN AUTO: 31.2 PG (ref 27–33)
MCHC RBC AUTO-ENTMCNC: 34.1 G/DL (ref 32.3–36.5)
MCV RBC AUTO: 91.7 FL (ref 81.4–97.8)
PLATELET # BLD AUTO: 271 K/UL (ref 164–446)
PMV BLD AUTO: 11 FL (ref 9–12.9)
RBC # BLD AUTO: 4.45 M/UL (ref 4.7–6.1)
WBC # BLD AUTO: 6.8 K/UL (ref 4.8–10.8)

## 2025-01-27 PROCEDURE — 36415 COLL VENOUS BLD VENIPUNCTURE: CPT

## 2025-01-27 PROCEDURE — 85027 COMPLETE CBC AUTOMATED: CPT

## 2025-01-27 NOTE — OR NURSING
Voicemail left for patient regarding the need for his pre surgery labs needing to be re-drawn due to lab error.  Asked patient to call back today to be rescheduled for lab draw.

## 2025-01-29 ENCOUNTER — ANESTHESIA EVENT (OUTPATIENT)
Dept: SURGERY | Facility: MEDICAL CENTER | Age: 70
End: 2025-01-29
Payer: COMMERCIAL

## 2025-01-30 ENCOUNTER — ANESTHESIA (OUTPATIENT)
Dept: SURGERY | Facility: MEDICAL CENTER | Age: 70
End: 2025-01-30
Payer: COMMERCIAL

## 2025-01-30 ENCOUNTER — PHARMACY VISIT (OUTPATIENT)
Dept: PHARMACY | Facility: MEDICAL CENTER | Age: 70
End: 2025-01-30
Payer: COMMERCIAL

## 2025-01-30 ENCOUNTER — HOSPITAL ENCOUNTER (OUTPATIENT)
Facility: MEDICAL CENTER | Age: 70
End: 2025-01-30
Attending: SURGERY | Admitting: SURGERY
Payer: COMMERCIAL

## 2025-01-30 VITALS
BODY MASS INDEX: 25.15 KG/M2 | DIASTOLIC BLOOD PRESSURE: 79 MMHG | HEART RATE: 70 BPM | SYSTOLIC BLOOD PRESSURE: 128 MMHG | TEMPERATURE: 97.2 F | HEIGHT: 70 IN | WEIGHT: 175.71 LBS | OXYGEN SATURATION: 92 % | RESPIRATION RATE: 18 BRPM

## 2025-01-30 DIAGNOSIS — G89.18 POST-OP PAIN: ICD-10-CM

## 2025-01-30 PROCEDURE — 160025 RECOVERY II MINUTES (STATS): Performed by: SURGERY

## 2025-01-30 PROCEDURE — 502714 HCHG ROBOTIC SURGERY SERVICES: Performed by: SURGERY

## 2025-01-30 PROCEDURE — 700111 HCHG RX REV CODE 636 W/ 250 OVERRIDE (IP): Mod: JZ | Performed by: ANESTHESIOLOGY

## 2025-01-30 PROCEDURE — C1781 MESH (IMPLANTABLE): HCPCS | Performed by: SURGERY

## 2025-01-30 PROCEDURE — 700101 HCHG RX REV CODE 250: Performed by: ANESTHESIOLOGY

## 2025-01-30 PROCEDURE — 700105 HCHG RX REV CODE 258: Performed by: SURGERY

## 2025-01-30 PROCEDURE — 160031 HCHG SURGERY MINUTES - 1ST 30 MINS LEVEL 5: Performed by: SURGERY

## 2025-01-30 PROCEDURE — 160042 HCHG SURGERY MINUTES - EA ADDL 1 MIN LEVEL 5: Performed by: SURGERY

## 2025-01-30 PROCEDURE — 160009 HCHG ANES TIME/MIN: Performed by: SURGERY

## 2025-01-30 PROCEDURE — A9270 NON-COVERED ITEM OR SERVICE: HCPCS | Performed by: ANESTHESIOLOGY

## 2025-01-30 PROCEDURE — 700102 HCHG RX REV CODE 250 W/ 637 OVERRIDE(OP): Performed by: ANESTHESIOLOGY

## 2025-01-30 PROCEDURE — 700101 HCHG RX REV CODE 250: Performed by: SURGERY

## 2025-01-30 PROCEDURE — 160048 HCHG OR STATISTICAL LEVEL 1-5: Performed by: SURGERY

## 2025-01-30 PROCEDURE — 49650 LAP ING HERNIA REPAIR INIT: CPT | Mod: 50 | Performed by: SURGERY

## 2025-01-30 PROCEDURE — 160002 HCHG RECOVERY MINUTES (STAT): Performed by: SURGERY

## 2025-01-30 PROCEDURE — 160035 HCHG PACU - 1ST 60 MINS PHASE I: Performed by: SURGERY

## 2025-01-30 PROCEDURE — RXMED WILLOW AMBULATORY MEDICATION CHARGE: Performed by: SURGERY

## 2025-01-30 PROCEDURE — 160036 HCHG PACU - EA ADDL 30 MINS PHASE I: Performed by: SURGERY

## 2025-01-30 PROCEDURE — 160046 HCHG PACU - 1ST 60 MINS PHASE II: Performed by: SURGERY

## 2025-01-30 DEVICE — MESH PROGRIP LAPROSCOPIC SELF FIXATING (1/CA): Type: IMPLANTABLE DEVICE | Status: FUNCTIONAL

## 2025-01-30 RX ORDER — ONDANSETRON 2 MG/ML
INJECTION INTRAMUSCULAR; INTRAVENOUS PRN
Status: DISCONTINUED | OUTPATIENT
Start: 2025-01-30 | End: 2025-01-30 | Stop reason: SURG

## 2025-01-30 RX ORDER — MELOXICAM 7.5 MG/1
7.5 TABLET ORAL ONCE
Status: COMPLETED | OUTPATIENT
Start: 2025-01-30 | End: 2025-01-30

## 2025-01-30 RX ORDER — HYDRALAZINE HYDROCHLORIDE 20 MG/ML
5 INJECTION INTRAMUSCULAR; INTRAVENOUS
Status: DISCONTINUED | OUTPATIENT
Start: 2025-01-30 | End: 2025-01-30 | Stop reason: HOSPADM

## 2025-01-30 RX ORDER — BUPIVACAINE HYDROCHLORIDE AND EPINEPHRINE 5; 5 MG/ML; UG/ML
INJECTION, SOLUTION EPIDURAL; INTRACAUDAL; PERINEURAL
Status: DISCONTINUED | OUTPATIENT
Start: 2025-01-30 | End: 2025-01-30 | Stop reason: HOSPADM

## 2025-01-30 RX ORDER — DEXMEDETOMIDINE HYDROCHLORIDE 100 UG/ML
INJECTION, SOLUTION INTRAVENOUS PRN
Status: DISCONTINUED | OUTPATIENT
Start: 2025-01-30 | End: 2025-01-30 | Stop reason: SURG

## 2025-01-30 RX ORDER — POLYETHYLENE GLYCOL 3350 17 G/17G
17 POWDER, FOR SOLUTION ORAL PRN
Qty: 510 G | Status: SHIPPED | OUTPATIENT
Start: 2025-01-30 | End: 2025-02-25

## 2025-01-30 RX ORDER — CEFAZOLIN SODIUM 1 G/3ML
INJECTION, POWDER, FOR SOLUTION INTRAMUSCULAR; INTRAVENOUS PRN
Status: DISCONTINUED | OUTPATIENT
Start: 2025-01-30 | End: 2025-01-30 | Stop reason: SURG

## 2025-01-30 RX ORDER — ROCURONIUM BROMIDE 10 MG/ML
INJECTION, SOLUTION INTRAVENOUS PRN
Status: DISCONTINUED | OUTPATIENT
Start: 2025-01-30 | End: 2025-01-30 | Stop reason: SURG

## 2025-01-30 RX ORDER — EPHEDRINE SULFATE 50 MG/ML
5 INJECTION, SOLUTION INTRAVENOUS
Status: DISCONTINUED | OUTPATIENT
Start: 2025-01-30 | End: 2025-01-30 | Stop reason: HOSPADM

## 2025-01-30 RX ORDER — MIDAZOLAM HYDROCHLORIDE 1 MG/ML
INJECTION INTRAMUSCULAR; INTRAVENOUS PRN
Status: DISCONTINUED | OUTPATIENT
Start: 2025-01-30 | End: 2025-01-30 | Stop reason: SURG

## 2025-01-30 RX ORDER — ONDANSETRON 2 MG/ML
4 INJECTION INTRAMUSCULAR; INTRAVENOUS
Status: DISCONTINUED | OUTPATIENT
Start: 2025-01-30 | End: 2025-01-30 | Stop reason: HOSPADM

## 2025-01-30 RX ORDER — DEXAMETHASONE SODIUM PHOSPHATE 4 MG/ML
INJECTION, SOLUTION INTRA-ARTICULAR; INTRALESIONAL; INTRAMUSCULAR; INTRAVENOUS; SOFT TISSUE PRN
Status: DISCONTINUED | OUTPATIENT
Start: 2025-01-30 | End: 2025-01-30 | Stop reason: SURG

## 2025-01-30 RX ORDER — OXYCODONE AND ACETAMINOPHEN 5; 325 MG/1; MG/1
1-2 TABLET ORAL EVERY 4 HOURS PRN
Qty: 20 TABLET | Refills: 0 | Status: SHIPPED | OUTPATIENT
Start: 2025-01-30 | End: 2025-02-06

## 2025-01-30 RX ORDER — ACETAMINOPHEN 500 MG
1000 TABLET ORAL ONCE
Status: COMPLETED | OUTPATIENT
Start: 2025-01-30 | End: 2025-01-30

## 2025-01-30 RX ORDER — OXYCODONE HCL 5 MG/5 ML
5 SOLUTION, ORAL ORAL
Status: COMPLETED | OUTPATIENT
Start: 2025-01-30 | End: 2025-01-30

## 2025-01-30 RX ORDER — HYDROMORPHONE HYDROCHLORIDE 1 MG/ML
0.4 INJECTION, SOLUTION INTRAMUSCULAR; INTRAVENOUS; SUBCUTANEOUS
Status: DISCONTINUED | OUTPATIENT
Start: 2025-01-30 | End: 2025-01-30 | Stop reason: HOSPADM

## 2025-01-30 RX ORDER — ACETAMINOPHEN 500 MG
500 TABLET ORAL EVERY 6 HOURS PRN
COMMUNITY
End: 2025-02-25

## 2025-01-30 RX ORDER — METHOCARBAMOL 500 MG/1
500 TABLET, FILM COATED ORAL ONCE
Status: COMPLETED | OUTPATIENT
Start: 2025-01-30 | End: 2025-01-30

## 2025-01-30 RX ORDER — EPHEDRINE SULFATE 50 MG/ML
INJECTION, SOLUTION INTRAVENOUS PRN
Status: DISCONTINUED | OUTPATIENT
Start: 2025-01-30 | End: 2025-01-30 | Stop reason: SURG

## 2025-01-30 RX ORDER — MEPERIDINE HYDROCHLORIDE 25 MG/ML
12.5 INJECTION INTRAMUSCULAR; INTRAVENOUS; SUBCUTANEOUS
Status: DISCONTINUED | OUTPATIENT
Start: 2025-01-30 | End: 2025-01-30 | Stop reason: HOSPADM

## 2025-01-30 RX ORDER — HYDROMORPHONE HYDROCHLORIDE 1 MG/ML
0.2 INJECTION, SOLUTION INTRAMUSCULAR; INTRAVENOUS; SUBCUTANEOUS
Status: DISCONTINUED | OUTPATIENT
Start: 2025-01-30 | End: 2025-01-30 | Stop reason: HOSPADM

## 2025-01-30 RX ORDER — DIPHENHYDRAMINE HYDROCHLORIDE 50 MG/ML
12.5 INJECTION INTRAMUSCULAR; INTRAVENOUS
Status: DISCONTINUED | OUTPATIENT
Start: 2025-01-30 | End: 2025-01-30 | Stop reason: HOSPADM

## 2025-01-30 RX ORDER — HALOPERIDOL 5 MG/ML
1 INJECTION INTRAMUSCULAR
Status: DISCONTINUED | OUTPATIENT
Start: 2025-01-30 | End: 2025-01-30 | Stop reason: HOSPADM

## 2025-01-30 RX ORDER — OXYCODONE HCL 5 MG/5 ML
10 SOLUTION, ORAL ORAL
Status: COMPLETED | OUTPATIENT
Start: 2025-01-30 | End: 2025-01-30

## 2025-01-30 RX ORDER — IPRATROPIUM BROMIDE AND ALBUTEROL SULFATE 2.5; .5 MG/3ML; MG/3ML
3 SOLUTION RESPIRATORY (INHALATION)
Status: DISCONTINUED | OUTPATIENT
Start: 2025-01-30 | End: 2025-01-30 | Stop reason: HOSPADM

## 2025-01-30 RX ORDER — SODIUM CHLORIDE, SODIUM LACTATE, POTASSIUM CHLORIDE, CALCIUM CHLORIDE 600; 310; 30; 20 MG/100ML; MG/100ML; MG/100ML; MG/100ML
INJECTION, SOLUTION INTRAVENOUS CONTINUOUS
Status: ACTIVE | OUTPATIENT
Start: 2025-01-30 | End: 2025-01-30

## 2025-01-30 RX ORDER — HYDROMORPHONE HYDROCHLORIDE 1 MG/ML
0.1 INJECTION, SOLUTION INTRAMUSCULAR; INTRAVENOUS; SUBCUTANEOUS
Status: DISCONTINUED | OUTPATIENT
Start: 2025-01-30 | End: 2025-01-30 | Stop reason: HOSPADM

## 2025-01-30 RX ORDER — SODIUM CHLORIDE, SODIUM LACTATE, POTASSIUM CHLORIDE, CALCIUM CHLORIDE 600; 310; 30; 20 MG/100ML; MG/100ML; MG/100ML; MG/100ML
INJECTION, SOLUTION INTRAVENOUS CONTINUOUS
Status: DISCONTINUED | OUTPATIENT
Start: 2025-01-30 | End: 2025-01-30 | Stop reason: HOSPADM

## 2025-01-30 RX ADMIN — SODIUM CHLORIDE, POTASSIUM CHLORIDE, SODIUM LACTATE AND CALCIUM CHLORIDE: 600; 310; 30; 20 INJECTION, SOLUTION INTRAVENOUS at 06:08

## 2025-01-30 RX ADMIN — FENTANYL CITRATE 50 MCG: 50 INJECTION, SOLUTION INTRAMUSCULAR; INTRAVENOUS at 07:41

## 2025-01-30 RX ADMIN — MIDAZOLAM HYDROCHLORIDE 2 MG: 1 INJECTION, SOLUTION INTRAMUSCULAR; INTRAVENOUS at 07:40

## 2025-01-30 RX ADMIN — FENTANYL CITRATE 50 MCG: 50 INJECTION, SOLUTION INTRAMUSCULAR; INTRAVENOUS at 08:22

## 2025-01-30 RX ADMIN — SUGAMMADEX 200 MG: 100 INJECTION, SOLUTION INTRAVENOUS at 08:40

## 2025-01-30 RX ADMIN — FENTANYL CITRATE 25 MCG: 50 INJECTION, SOLUTION INTRAMUSCULAR; INTRAVENOUS at 09:40

## 2025-01-30 RX ADMIN — EPHEDRINE SULFATE 10 MG: 50 INJECTION, SOLUTION INTRAVENOUS at 08:34

## 2025-01-30 RX ADMIN — PROPOFOL 150 MG: 10 INJECTION, EMULSION INTRAVENOUS at 07:42

## 2025-01-30 RX ADMIN — FENTANYL CITRATE 25 MCG: 50 INJECTION, SOLUTION INTRAMUSCULAR; INTRAVENOUS at 09:33

## 2025-01-30 RX ADMIN — ONDANSETRON 4 MG: 2 INJECTION INTRAMUSCULAR; INTRAVENOUS at 08:42

## 2025-01-30 RX ADMIN — METHOCARBAMOL 500 MG: 500 TABLET ORAL at 06:17

## 2025-01-30 RX ADMIN — DEXMEDETOMIDINE HYDROCHLORIDE 20 MCG: 100 INJECTION, SOLUTION INTRAVENOUS at 08:07

## 2025-01-30 RX ADMIN — DEXAMETHASONE SODIUM PHOSPHATE 8 MG: 4 INJECTION INTRA-ARTICULAR; INTRALESIONAL; INTRAMUSCULAR; INTRAVENOUS; SOFT TISSUE at 07:49

## 2025-01-30 RX ADMIN — ROCURONIUM BROMIDE 10 MG: 50 INJECTION, SOLUTION INTRAVENOUS at 08:17

## 2025-01-30 RX ADMIN — MELOXICAM 7.5 MG: 7.5 TABLET ORAL at 06:17

## 2025-01-30 RX ADMIN — SODIUM CHLORIDE, POTASSIUM CHLORIDE, SODIUM LACTATE AND CALCIUM CHLORIDE: 600; 310; 30; 20 INJECTION, SOLUTION INTRAVENOUS at 08:35

## 2025-01-30 RX ADMIN — CEFAZOLIN 2 G: 1 INJECTION, POWDER, FOR SOLUTION INTRAMUSCULAR; INTRAVENOUS at 07:42

## 2025-01-30 RX ADMIN — DEXMEDETOMIDINE HYDROCHLORIDE 20 MCG: 100 INJECTION, SOLUTION INTRAVENOUS at 07:52

## 2025-01-30 RX ADMIN — OXYCODONE HYDROCHLORIDE 5 MG: 5 SOLUTION ORAL at 09:33

## 2025-01-30 RX ADMIN — EPHEDRINE SULFATE 10 MG: 50 INJECTION, SOLUTION INTRAVENOUS at 08:20

## 2025-01-30 RX ADMIN — ACETAMINOPHEN 1000 MG: 500 TABLET ORAL at 06:16

## 2025-01-30 RX ADMIN — FENTANYL CITRATE 50 MCG: 50 INJECTION, SOLUTION INTRAMUSCULAR; INTRAVENOUS at 08:08

## 2025-01-30 RX ADMIN — ROCURONIUM BROMIDE 60 MG: 50 INJECTION, SOLUTION INTRAVENOUS at 07:42

## 2025-01-30 ASSESSMENT — PAIN DESCRIPTION - PAIN TYPE
TYPE: SURGICAL PAIN

## 2025-01-30 ASSESSMENT — PAIN SCALES - GENERAL: PAIN_LEVEL: 3

## 2025-01-30 ASSESSMENT — FIBROSIS 4 INDEX: FIB4 SCORE: 1.07

## 2025-01-30 NOTE — ANESTHESIA PROCEDURE NOTES
Airway    Date/Time: 1/30/2025 7:43 AM    Performed by: Jennifer Spain M.D.  Authorized by: Jennifer Spain M.D.    Location:  OR  Urgency:  Elective  Difficult Airway: No    Indications for Airway Management:  Anesthesia      Spontaneous Ventilation: absent    Sedation Level:  Deep  Preoxygenated: Yes    Patient Position:  Sniffing  Mask Difficulty Assessment:  1 - vent by mask  Final Airway Type:  Endotracheal airway  Final Endotracheal Airway:  ETT  Cuffed: Yes    Technique Used for Successful ETT Placement:  Direct laryngoscopy    Insertion Site:  Oral  Blade Type:  Joana  Laryngoscope Blade/Videolaryngoscope Blade Size:  4  ETT Size (mm):  7.5  Measured from:  Teeth  ETT to Teeth (cm):  23  Placement Verified by: auscultation and capnometry    Cormack-Lehane Classification:  Grade IIa - partial view of glottis  Number of Attempts at Approach:  1

## 2025-01-30 NOTE — ANESTHESIA TIME REPORT
Anesthesia Start and Stop Event Times       Date Time Event    1/30/2025 0713 Ready for Procedure     0739 Anesthesia Start     0855 Anesthesia Stop          Responsible Staff  01/30/25      Name Role Begin End    Jennifer Spain M.D. Anesth 0739 0855          Overtime Reason:  no overtime (within assigned shift)    Comments:

## 2025-01-30 NOTE — ANESTHESIA POSTPROCEDURE EVALUATION
Patient: Felipe Hutson    Procedure Summary       Date: 01/30/25 Room / Location: [place] [address] / SURGERY [facility]    Anesthesia Start: 0739 Anesthesia Stop: 0855    Procedure: ROBOTIC BILATERAL  INGUINAL HERNIA REPAIR WITH MESH (Abdomen) Diagnosis: (BIlATERAL INGUINAL HERNIA)    Surgeons: Reza Bailey M.D. Responsible Provider: Jennifer Spain M.D.    Anesthesia Type: general ASA Status: 2            Final Anesthesia Type: general  Last vitals  BP   Blood Pressure : 128/79    Temp   36.2 °C (97.2 °F)    Pulse   70   Resp   18    SpO2   92 %      Anesthesia Post Evaluation    Patient location during evaluation: PACU  Patient participation: complete - patient participated  Level of consciousness: awake and alert  Pain score: 3    Airway patency: patent  Anesthetic complications: no  Cardiovascular status: hemodynamically stable  Respiratory status: acceptable  Hydration status: euvolemic    PONV: none          There were no known notable events for this encounter.     Nurse Pain Score: 3 (NPRS)

## 2025-01-30 NOTE — PROGRESS NOTES
Pharmacy Medication Reconciliation      ~Medication reconciliation updated and complete per patient   ~Allergies have been verified and updated   ~No oral ABX within the last 30 days  ~Patient home pharmacy :  [facility] [phone]      ~Anticoagulants (rivaroxaban, apixaban, edoxaban, dabigatran, warfarin, enoxaparin) taken in the last 14 days? No

## 2025-01-30 NOTE — DISCHARGE INSTRUCTIONS
HOME CARE INSTRUCTIONS    ACTIVITY: Rest and take it easy for the first 24 hours.  A responsible adult is recommended to remain with you during that time.  It is normal to feel sleepy.  We encourage you to not do anything that requires balance, judgment or coordination.    FOR 24 HOURS DO NOT:  Drive, operate machinery or run household appliances.  Drink beer or alcoholic beverages.  Make important decisions or sign legal documents.    SPECIAL INSTRUCTIONS:     Diet as tolerated  May shower daily with wounds uncovered  No heavy lifting or straining for 4 weeks after surgery    DIET: To avoid nausea, slowly advance diet as tolerated, avoiding spicy or greasy foods for the first day.  Add more substantial food to your diet according to your physician's instructions.  Babies can be fed formula or breast milk as soon as they are hungry.  INCREASE FLUIDS AND FIBER TO AVOID CONSTIPATION.      MEDICATIONS: Resume taking daily medication.  Take prescribed pain medication with food.  If no medication is prescribed, you may take non-aspirin pain medication if needed.  PAIN MEDICATION CAN BE VERY CONSTIPATING.  Take a stool softener or laxative such as senokot, pericolace, or milk of magnesia if needed.    Prescription given for percocet , miralax sent to [facility] .    Given Tylenol 1000mg , Mobic  , Robaxin at 06:17AM .  Last pain medication given oxycodone at 09:33AM    A follow-up appointment should be arranged with your doctor in  [phone] ; call to schedule.    You should CALL YOUR PHYSICIAN if you develop:  Fever greater than 101 degrees F.  Pain not relieved by medication, or persistent nausea or vomiting.  Excessive bleeding (blood soaking through dressing) or unexpected drainage from the wound.  Extreme redness or swelling around the incision site, drainage of pus or foul smelling drainage.  Inability to urinate or empty your bladder within 8 hours.  Problems with breathing or chest pain.    You should  call 911 if you develop problems with breathing or chest pain.  If you are unable to contact your doctor or surgical center, you should go to the nearest emergency room or urgent care center.  Physician's telephone #:  [phone]     MILD FLU-LIKE SYMPTOMS ARE NORMAL.  YOU MAY EXPERIENCE GENERALIZED MUSCLE ACHES, THROAT IRRITATION, HEADACHE AND/OR SOME NAUSEA.    If any questions arise, call your doctor.  If your doctor is not available, please feel free to call the Surgical Center at [phone].  The Center is open Monday through Friday from 7AM to 7PM.      A registered nurse may call you a few days after your surgery to see how you are doing after your procedure.    You may also receive a survey in the mail within the next two weeks and we ask that you take a few moments to complete the survey and return it to us.  Our goal is to provide you with very good care and we value your comments.     Depression / Suicide Risk    As you are discharged from this [facility], it is important to learn how to keep safe from harming yourself.    Recognize the warning signs:  Abrupt changes in personality, positive or negative- including increase in energy   Giving away possessions  Change in eating patterns- significant weight changes-  positive or negative  Change in sleeping patterns- unable to sleep or sleeping all the time   Unwillingness or inability to communicate  Depression  Unusual sadness, discouragement and loneliness  Talk of wanting to die  Neglect of personal appearance   Rebelliousness- reckless behavior  Withdrawal from people/activities they love  Confusion- inability to concentrate     If you or a loved one observes any of these behaviors or has concerns about self-harm, here's what you can do:  Talk about it- your feelings and reasons for harming yourself  Remove any means that you might use to hurt yourself (examples: pills, rope, extension cords, firearm)  Get professional help from the  community (Mental Health, Substance Abuse, psychological counseling)  Do not be alone:Call your Safe Contact- someone whom you trust who will be there for you.  Call your local CRISIS HOTLINE 911-8255 or [phone]  Call your local Children's Mobile Crisis Response Team [place] (805) 654-0391 or www.[employer]  Call the toll free National Suicide Prevention Hotlines   National Suicide Prevention Lifeline 048-338-TLJR (2285)  [facility] Line Network 800-SUICIDE (051-0351)    I acknowledge receipt and understanding of these Home Care instructions.

## 2025-01-30 NOTE — OP REPORT
DATE OF SERVICE:  01/30/2025     PREOPERATIVE DIAGNOSIS:  Right inguinal hernia, possible left inguinal hernia.     POSTOPERATIVE DIAGNOSIS:  Bilateral indirect inguinal hernias.     PROCEDURE:  Robotic bilateral inguinal hernia repairs with mesh.     SURGEON:  Reza Bailey MD     ASSISTANT:  None.     ANESTHESIA:  General endotracheal anesthesia.     ESTIMATED BLOOD LOSS:  5 mL.     SPECIMENS:  None.     COMPLICATIONS:  None.     CONDITION:  Stable.     INDICATIONS FOR PROCEDURE:  This is a 69-year-old male who presents with a   confirmed right and possible left inguinal hernia for elective surgery.    Risks, benefits and alternatives of the procedure were explained to him before   proceeding.     OPERATIVE FINDINGS:  Bilateral indirect inguinal hernias reduced and repaired   with preperitoneal meshes in place.     OPERATIVE TECHNIQUE:  After informed consent was obtained, the patient was   taken to the operating room and placed in the supine position.  After adequate   endotracheal anesthesia was achieved, the abdomen was prepped and draped in   sterile fashion.  Operation was begun by placing an 8 mm periumbilical   incision through which an 8 mm trocar was introduced into the abdomen using   Optiview technique.  After pneumoperitoneum was achieved, 2 additional 8 mm   robotic ports were placed, all trocars were placed with 0.5% Marcaine with   epinephrine for local anesthesia.  We positioned the patient and confirmed the   diagnosis of bilateral inguinal hernias.  We then docked the da Adriana Xi   robotic system for a pelvic approach.  Operation was begun by opening the   preperitoneal space on the right and left sides.  We identified the bilateral   epigastric vessels, Roger's ligaments and internal inguinal rings.  We   dissected first the left and then the right internal inguinal ring, reducing   the indirect hernia sacs away from the spermatic cord.  No direct hernias were   identified.     We then  placed two 10 x 15 cm ProGrip meshes into the preperitoneal space.    The space was then closed with a running 2-0 Stratafix suture.  Needles were   retrieved, pneumoperitoneum reduced and trocars and robot were removed.  We   then closed the incisions with 4-0 Monocryl and Dermabond.  The patient was   returned to the PACU in stable condition.  All instrument counts were correct   at the end of the procedure.        ______________________________  MD CHOCO Bruner/ROSINA    DD:  01/30/2025 08:56  DT:  01/30/2025 09:05    Job#:  [id number]

## 2025-01-30 NOTE — OR NURSING
Pt arrived to PACU II at 1017. Pt aa/ox4, VSS. Discharge criteria met. Pain is 3/10 which patient states is tolerable. Abdominal lap stabs x3 with dermabond, harman, ice pack in place. Pt changed into clothing with assistance. Discharge instructions given; pt and family verbalized understanding and questions answered.  IV removed, tip intact. Will follow-up with Dr. rice in 1 week. Prescriptions were picked-up by family. Pt discharged home and escorted via w/c with CNA in stable condition.

## 2025-01-30 NOTE — ANESTHESIA PREPROCEDURE EVALUATION
Case: [id number] Date/Time: 01/30/25 0715    Procedure: ROBOTIC RIGHT POSSIBLE LEFT INGUINAL HERNIA REPAIR WITH MESH    Pre-op diagnosis: RIGHT INGUINAL HERNIA    Location: [facility] OR 14 / SURGERY [facility]    Surgeons: Reza Bailey M.D.            Relevant Problems   Other   (positive) BPH (benign prostatic hypertrophy)   (positive) Dyslipidemia   (positive) Non-recurrent bilateral inguinal hernia without obstruction or gangrene   (positive) S/P TURP       Physical Exam    Airway   Mallampati: II  TM distance: >3 FB  Neck ROM: full       Cardiovascular - normal exam  Rhythm: regular  Rate: normal  (-) murmur     Dental - normal exam           Pulmonary - normal exam  Breath sounds clear to auscultation     Abdominal    Neurological - normal exam                   Anesthesia Plan    ASA 2       Plan - general       Airway plan will be ETT          Induction: intravenous    Postoperative Plan: Postoperative administration of opioids is intended.    Pertinent diagnostic labs and testing reviewed    Informed Consent:    Anesthetic plan and risks discussed with patient.    Use of blood products discussed with: patient whom consented to blood products.

## 2025-02-12 ENCOUNTER — OFFICE VISIT (OUTPATIENT)
Dept: SURGICAL ONCOLOGY | Facility: MEDICAL CENTER | Age: 70
End: 2025-02-12
Payer: COMMERCIAL

## 2025-02-12 VITALS
HEART RATE: 95 BPM | SYSTOLIC BLOOD PRESSURE: 142 MMHG | OXYGEN SATURATION: 97 % | DIASTOLIC BLOOD PRESSURE: 82 MMHG | BODY MASS INDEX: 24.34 KG/M2 | TEMPERATURE: 99 F | HEIGHT: 70 IN | WEIGHT: 170 LBS

## 2025-02-12 DIAGNOSIS — K40.20 NON-RECURRENT BILATERAL INGUINAL HERNIA WITHOUT OBSTRUCTION OR GANGRENE: ICD-10-CM

## 2025-02-12 PROCEDURE — 99024 POSTOP FOLLOW-UP VISIT: CPT | Performed by: SURGERY

## 2025-02-12 ASSESSMENT — FIBROSIS 4 INDEX: FIB4 SCORE: 1.07

## 2025-02-13 PROBLEM — K40.20 NON-RECURRENT BILATERAL INGUINAL HERNIA WITHOUT OBSTRUCTION OR GANGRENE: Status: RESOLVED | Noted: 2025-01-02 | Resolved: 2025-02-13

## 2025-02-13 PROBLEM — K40.90 NON-RECURRENT INGUINAL HERNIA WITHOUT OBSTRUCTION OR GANGRENE: Status: RESOLVED | Noted: 2025-01-13 | Resolved: 2025-02-13

## 2025-02-13 NOTE — PATIENT INSTRUCTIONS
Continue to eat a regular diet, high fiber intake is encouraged  You may shower daily with wounds uncovered  No heavy lifting over 10lbs or straining for 4 weeks post op  Follow up as needed

## 2025-02-13 NOTE — PROGRESS NOTES
Subjective:   2/12/2025  1:41 PM  Primary care physician:Alysno Castillo D.O.      Chief Complaint:   Chief Complaint   Patient presents with    Post-op     PO 1/30- ROBOTIC BILATERAL  INGUINAL HERNIA REPAIR WITH MESH       History of presenting illness:  Felipe Hutson  is a pleasant 69 y.o. year old male who Underwent robotic bilateral inguinal hernia repair with mesh and now presents for follow up.  Pt is doing well, tolerating a regular diet, ambulating, having bowle function.  Pain is currently well controlled.  Wounds are not draining or otherwise bothersome.          Past Medical History:   Diagnosis Date    Acute nasopharyngitis Jan6    BPH 06/10/2009    High cholesterol 2019    Inguinal hernia 01/22/2025    Non-recurrent bilateral inguinal hernia without obstruction or gangrene 01/02/2025     Past Surgical History:   Procedure Laterality Date    INGUINAL HERNIA REPAIR ROBOTIC XI N/A 1/30/2025    Procedure: ROBOTIC BILATERAL  INGUINAL HERNIA REPAIR WITH MESH;  Surgeon: Reza Bailey M.D.;  Location: SURGERY [facility];  Service: Gen Robotic    HYDROCELECTOMY ADULT      NO PERTINENT PAST SURGICAL HISTORY  1975    Hydrocele    OTHER  2010    Turp    TURP-VAPOR N/A      No Known Allergies  Current Outpatient Medications   Medication Sig    acetaminophen (TYLENOL) 500 MG Tab Take 500 mg by mouth every 6 hours as needed for Mild Pain (headache).    polyethylene glycol 3350 (MIRALAX) 17 GM/SCOOP Powder Take 17 g by mouth as needed (constipation).    atorvastatin (LIPITOR) 20 MG Tab TAKE 1 TABLET BY MOUTH EVERY DAY (Patient taking differently: Take 20 mg by mouth every morning.)    ibuprofen (MOTRIN) 200 MG Tab Take 200 mg by mouth every 6 hours as needed.    Cholecalciferol (VITAMIN D) 50 MCG (2000 UT) Cap Take  by mouth every day. Only takes during the winter season     Social History     Socioeconomic History    Marital status:      Spouse name: Not on file    Number of children: Not on file     "Years of education: Not on file    Highest education level: Not on file   Occupational History    Not on file   Tobacco Use    Smoking status: Never    Smokeless tobacco: Never    Tobacco comments:     None   Vaping Use    Vaping status: Never Used   Substance and Sexual Activity    Alcohol use: No     Comment: rare    Drug use: No    Sexual activity: Not Currently   Other Topics Concern    Not on file   Social History Narrative    ** Merged History Encounter **          Social Drivers of Health     Financial Resource Strain: Not on file   Food Insecurity: Not on file   Transportation Needs: Not on file   Physical Activity: Not on file   Stress: Not on file   Social Connections: Not on file   Intimate Partner Violence: Not on file   Housing Stability: Not on file      Family History   Problem Relation Age of Onset    Hyperlipidemia Father     Cancer Father 99        Smoker    Hyperlipidemia Maternal Grandmother     Heart Disease Maternal Grandmother        ROS: Negative except as detailed in HPI.     Objective:   BP (!) 142/82 (BP Location: Left arm, Patient Position: Sitting, BP Cuff Size: Adult)   Pulse 95   Temp 37.2 °C (99 °F) (Temporal)   Ht 1.778 m (5' 10\")   Wt 77.1 kg (170 lb)   SpO2 97%   BMI 24.39 kg/m²     Physical Exam:  Constitutional: Well-developed and well-nourished. Not diaphoretic. No distress.   Skin: Skin is warm and dry. No rash noted.  Head: Atraumatic without lesions.  Eyes: Conjunctivae and extraocular motions are normal. No scleral icterus.   Nose: Nares patent. Septum midline. No discharge.   Neck: Supple, trachea midline. Normal range of motion. No thyromegaly present. No lymphadenopathy--cervical or supraclavicular.  Cardiovascular: Regular rate and rhythm, 2+ pulses   Lungs: Effort normal. No chest wall masses.   Abdomen: Soft, appr tender, nondistended, no peritonitis  Wounds clean, dry and intact  No evidence of hernia recurrence on exam  Extremities: Warm and well perfused, no " pitting edema  Musculoskeletal: All major joints AROM full in all directions without pain.  Neurological: Alert and oriented x 3.   Psychiatric:  Behavior, mood, and affect are appropriate.        Diagnosis:     Assessment & Plan  Non-recurrent bilateral inguinal hernia without obstruction or gangrene               Medical Decision Making:  Today's Assessment / Status / Plan:     69y M  s/p Robotic Bilateral IHR who is doing well post-op.  We discussed continuing a regular diet, ambulating daily.  Ok to shower with wounds uncovered.  No heavy lifting over 10lbs or straining for 4-6 weeks post-op.  Follow up prn

## 2025-02-25 ENCOUNTER — RESULTS FOLLOW-UP (OUTPATIENT)
Dept: MEDICAL GROUP | Facility: LAB | Age: 70
End: 2025-02-25

## 2025-02-25 ENCOUNTER — OFFICE VISIT (OUTPATIENT)
Dept: MEDICAL GROUP | Facility: LAB | Age: 70
End: 2025-02-25
Payer: COMMERCIAL

## 2025-02-25 VITALS
DIASTOLIC BLOOD PRESSURE: 72 MMHG | OXYGEN SATURATION: 95 % | TEMPERATURE: 97.8 F | SYSTOLIC BLOOD PRESSURE: 130 MMHG | BODY MASS INDEX: 25.62 KG/M2 | WEIGHT: 179 LBS | HEIGHT: 70 IN | HEART RATE: 75 BPM

## 2025-02-25 DIAGNOSIS — R06.02 SOB (SHORTNESS OF BREATH): ICD-10-CM

## 2025-02-25 DIAGNOSIS — J30.2 SEASONAL ALLERGIES: ICD-10-CM

## 2025-02-25 DIAGNOSIS — J34.89 RHINORRHEA: ICD-10-CM

## 2025-02-25 LAB
FLUAV RNA SPEC QL NAA+PROBE: NEGATIVE
FLUBV RNA SPEC QL NAA+PROBE: NEGATIVE
RSV RNA SPEC QL NAA+PROBE: NEGATIVE
SARS-COV-2 RNA RESP QL NAA+PROBE: NEGATIVE

## 2025-02-25 PROCEDURE — 3075F SYST BP GE 130 - 139MM HG: CPT | Performed by: STUDENT IN AN ORGANIZED HEALTH CARE EDUCATION/TRAINING PROGRAM

## 2025-02-25 PROCEDURE — 3078F DIAST BP <80 MM HG: CPT | Performed by: STUDENT IN AN ORGANIZED HEALTH CARE EDUCATION/TRAINING PROGRAM

## 2025-02-25 PROCEDURE — 0241U POCT CEPHEID COV-2, FLU A/B, RSV - PCR: CPT | Performed by: STUDENT IN AN ORGANIZED HEALTH CARE EDUCATION/TRAINING PROGRAM

## 2025-02-25 PROCEDURE — RXMED WILLOW AMBULATORY MEDICATION CHARGE: Performed by: STUDENT IN AN ORGANIZED HEALTH CARE EDUCATION/TRAINING PROGRAM

## 2025-02-25 PROCEDURE — 99214 OFFICE O/P EST MOD 30 MIN: CPT | Performed by: STUDENT IN AN ORGANIZED HEALTH CARE EDUCATION/TRAINING PROGRAM

## 2025-02-25 RX ORDER — ALBUTEROL SULFATE 90 UG/1
2 INHALANT RESPIRATORY (INHALATION) EVERY 4 HOURS PRN
Qty: 8.5 G | Refills: 0 | Status: SHIPPED | OUTPATIENT
Start: 2025-02-25

## 2025-02-25 ASSESSMENT — ENCOUNTER SYMPTOMS
VOMITING: 0
ABDOMINAL PAIN: 0
SORE THROAT: 0
WEIGHT LOSS: 0
NAUSEA: 0
SPUTUM PRODUCTION: 0
COUGH: 0
EYE REDNESS: 1
DIARRHEA: 0
CHILLS: 0
WHEEZING: 0
SHORTNESS OF BREATH: 1
FEVER: 0

## 2025-02-25 ASSESSMENT — FIBROSIS 4 INDEX: FIB4 SCORE: 1.07

## 2025-02-25 NOTE — PROGRESS NOTES
Verbal consent was acquired by the patient to use [employer] ambient listening note generation during this visit Yes.    Subjective:     Chief Complaint   Patient presents with    Shortness of Breath       HPI:   PCP Reza Sunshine M.D. unavailable.    History of Present Illness  The patient is a 69-year-old male who presents with concerns about shortness of breath.    States he was sick prior to his surgery in December or January, states he was diagnosed with bronchitis. His symptoms began post-Christmas with the onset of a cough that persisted until his hernia surgery on 1/30. Post-surgery, he experienced a period of relief before the recurrence of cold-like symptoms. He reports intermittent episodes of dyspnea since Christmas, initially attributed to bronchitis and coughing, but has noticed a resurgence of the SOB but no cough in the past few days. He does not currently have a cough but reports a history of frequent coughing during his bronchitis episode. He reports no chest pain, leg swelling, congestion, or sore throat. 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 He reports no wheezing. He reports no fever. These episodes of SOB are brief, lasting less than a minute, and are alleviated by water intake. He reports lifelong history of asthma, previously managed with an inhaler, which he has since discontinued.    He has a history of seasonal allergies, typically managed with Claritin, although he has not taken it this season. He reports that Claritin effectively relieves his symptoms, including itchy eyes, within 30 minutes. Current reports rhinorrhea and itchy red eyes    He has a blood test coming up for the urologist. He had a cystoscopy and the bladder was clear. His urine flow seems to be better after the surgery. He will see the urologist in 3 weeks.    Review of Systems   Constitutional:  Negative for chills, fever, malaise/fatigue and weight loss.   HENT:  Negative for congestion and  "sore throat.    Eyes:  Positive for redness.   Respiratory:  Positive for shortness of breath. Negative for cough, sputum production and wheezing.    Cardiovascular:  Negative for chest pain and leg swelling.   Gastrointestinal:  Negative for abdominal pain, diarrhea, nausea and vomiting.   Skin:  Negative for rash.       Objective:     Exam:  /72 (BP Location: Right arm, Patient Position: Sitting, BP Cuff Size: Adult)   Pulse 75   Temp 36.6 °C (97.8 °F) (Temporal)   Ht 1.778 m (5' 10\")   Wt 81.2 kg (179 lb)   SpO2 95%   BMI 25.68 kg/m²  Body mass index is 25.68 kg/m².    Physical Exam  Vitals reviewed.   Constitutional:       General: He is not in acute distress.     Appearance: Normal appearance. He is not ill-appearing.   HENT:      Head: Normocephalic and atraumatic.      Right Ear: Tympanic membrane, ear canal and external ear normal.      Left Ear: Tympanic membrane, ear canal and external ear normal.      Nose: Rhinorrhea present. Rhinorrhea is clear.      Mouth/Throat:      Mouth: Mucous membranes are moist.      Pharynx: No oropharyngeal exudate or posterior oropharyngeal erythema.   Eyes:      General: No scleral icterus.     Conjunctiva/sclera: Conjunctivae normal.   Cardiovascular:      Rate and Rhythm: Normal rate and regular rhythm.      Heart sounds: Normal heart sounds. No murmur heard.  Pulmonary:      Effort: Pulmonary effort is normal. No respiratory distress.      Breath sounds: Normal breath sounds. No stridor. No wheezing, rhonchi or rales.   Musculoskeletal:      Cervical back: Normal range of motion and neck supple.      Right lower leg: No edema.      Left lower leg: No edema.   Skin:     General: Skin is warm and dry.   Neurological:      General: No focal deficit present.      Mental Status: He is alert and oriented to person, place, and time.   Psychiatric:         Mood and Affect: Mood normal.         Behavior: Behavior normal.         Thought Content: Thought content normal. "       Assessment & Plan:     69 y.o. male with the following -     1. Seasonal allergies  2. Rhinorrhea  Chronic seasonal allergies with recent onset in the past few days of allergy symptoms. He reports that his symptoms feel similar to his usual seasonal allergies, which typically occur in May. He is advised to start taking Claritin, which has been effective for him in the past. Viral testing was negative. F/u if not improved.  - POCT CEPHEID COV-2, FLU A/B, RSV - PCR    3. SOB (shortness of breath)  Occurred with 'bronchitis' earlier in the year, resolved and now returned but mild/brief. He reports a lifelong history of asthma, no wheezing appreciated on exam. An albuterol inhaler has been prescribed for use as needed, specifically 2 puffs every 4 hours for cough, wheezing, or difficulty breathing. If his allergies worsen and his asthma flares up, a medication that treats both conditions may be considered like singulair. Viral testing negative. At this time I don't suspect a PE, but given recent surgery if worsening would consider. Plan on CXR given symptoms to exclude occult PNA etc.   - DX-CHEST-2 VIEWS; Future  - albuterol 108 (90 Base) MCG/ACT Aero Soln inhalation aerosol; Inhale 2 Puffs every four hours as needed for Shortness of Breath.  Dispense: 8.5 g; Refill: 0  - POCT CEPHEID COV-2, FLU A/B, RSV - PCR    Return if symptoms worsen or fail to improve.    Please note that this dictation was created using voice recognition software. I have made every reasonable attempt to correct obvious errors, but I expect that there are errors of grammar and possibly content that I did not discover before finalizing the note.

## 2025-02-26 ENCOUNTER — HOSPITAL ENCOUNTER (OUTPATIENT)
Dept: RADIOLOGY | Facility: MEDICAL CENTER | Age: 70
End: 2025-02-26
Attending: STUDENT IN AN ORGANIZED HEALTH CARE EDUCATION/TRAINING PROGRAM
Payer: COMMERCIAL

## 2025-02-26 DIAGNOSIS — R06.02 SOB (SHORTNESS OF BREATH): ICD-10-CM

## 2025-02-26 PROCEDURE — 71046 X-RAY EXAM CHEST 2 VIEWS: CPT

## 2025-02-27 ENCOUNTER — PHARMACY VISIT (OUTPATIENT)
Dept: PHARMACY | Facility: MEDICAL CENTER | Age: 70
End: 2025-02-27
Payer: COMMERCIAL

## 2025-03-07 ENCOUNTER — HOSPITAL ENCOUNTER (OUTPATIENT)
Facility: MEDICAL CENTER | Age: 70
End: 2025-03-07
Attending: UROLOGY
Payer: COMMERCIAL

## 2025-03-07 DIAGNOSIS — R97.20 ELEVATED PSA: ICD-10-CM

## 2025-03-07 LAB — PSA SERPL-MCNC: 8.74 NG/ML (ref 0–4)

## 2025-03-07 PROCEDURE — 36415 COLL VENOUS BLD VENIPUNCTURE: CPT

## 2025-03-07 PROCEDURE — 84153 ASSAY OF PSA TOTAL: CPT

## 2025-03-10 ENCOUNTER — RESULTS FOLLOW-UP (OUTPATIENT)
Dept: UROLOGY | Facility: MEDICAL CENTER | Age: 70
End: 2025-03-10
Payer: COMMERCIAL

## 2025-03-18 ENCOUNTER — OFFICE VISIT (OUTPATIENT)
Dept: UROLOGY | Facility: MEDICAL CENTER | Age: 70
End: 2025-03-18
Payer: COMMERCIAL

## 2025-03-18 DIAGNOSIS — R97.20 ELEVATED PSA: ICD-10-CM

## 2025-03-18 PROCEDURE — 99213 OFFICE O/P EST LOW 20 MIN: CPT | Performed by: UROLOGY

## 2025-03-18 NOTE — PROGRESS NOTES
Subjective  Felipe Hutson is a 69 y.o. male who presents today for follow up PSA follow-up. He has followed with [facility] for BPH and history of elevated PSA. No recent imaging or biopsy needed. Reports he did have TURP 10+ years ago with negative pathology. He does continue on finasteride, but 2.5mg daily (half the normal dose. PSA 8.52 on 8/2/24.  This reading is likely affected by his being on finasteride.  He was on 5 mg of finasteride would consider his PSA to be double, at 17.  However since he is on 2.5 mg 1 cannot be certain of his PSA but likely it is in the range of 13. Voids fine, he does not know why he is on finasteride.  Today he returns with a PSA off of finasteride.  It appears stable to 6 months ago.  He had an MRI in October 2024: PI-RADS 3 lesion. His PSAD is 0.115    Prostatic Specific Antigen Tot   Date Value Ref Range Status   03/07/2025 8.74 (H) 0.00 - 4.00 ng/mL Final     Comment:     Performed using Roche romeo e immunoassay analyzer. tPSA values determined  on patient samples by different testing procedures cannot be directly  compared with one another and could be the cause of erroneous medical  interpretations. If there is a change in the tPSA assay procedure used while  monitoring therapy, then new baselines may need to be established when  comparing previous results.     08/02/2024 8.52 (H) 0.00 - 4.00 ng/mL Final     Comment:     Performed using Roche romeo e immunoassay analyzer. tPSA values determined  on patient samples by different testing procedures cannot be directly  compared with one another and could be the cause of erroneous medical  interpretations. If there is a change in the tPSA assay procedure used while  monitoring therapy, then new baselines may need to be established when  comparing previous results.     09/11/2019 5.89 (H) 0.00 - 4.00 ng/mL Final     Comment:     The Access Hybritech PSA assay is a paramagnetic particle,  chemiluminescent immunoassay  for the quantitative determination  of total prostate specific antigen (PSA) levels using the  Access Immunoassay System. Values obtained with different  methods cannot be used interchangeably for patient monitoring.     10/15/2018 5.48 (H) 0.00 - 4.00 ng/mL Final     Comment:     The Access Hybritech PSA assay is a paramagnetic particle,  chemiluminescent immunoassay for the quantitative determination  of total prostate specific antigen (PSA) levels using the  Access Immunoassay System. Values obtained with different  methods cannot be used interchangeably for patient monitoring.     10/10/2017 4.85 (H) 0.00 - 4.00 ng/mL Final     Comment:     The Access Hybritech PSA assay is a paramagnetic particle,  chemiluminescent immunoassay for the quantitative determination  of total prostate specific antigen (PSA) levels using the  Access Immunoassay System. Values obtained with different  methods cannot be used interchangeably for patient monitoring.     09/13/2016 5.00 (H) 0.00 - 4.00 ng/mL Final     Comment:     The Access Hybritech PSA assay is a paramagnetic particle,  chemiluminescent immunoassay for the quantitative determination  of total prostate specific antigen (PSA) levels using the  Access Immunoassay System. Values obtained with different  methods cannot be used interchangeably for patient monitoring.     09/09/2015 3.65 0.00 - 4.00 ng/mL Final     Comment:     The Access Hybritech PSA assay is a paramagnetic particle,  chemiluminescent immunoassay for the quantitative determination  of total prostate specific antigen (PSA) levels using the  Access Immunoassay System. Values obtained with different  methods cannot be used interchangeably for patient monitoring.     09/24/2014 3.23 0.00 - 4.00 ng/mL Final     Comment:     The Access Hybritech PSA assay is a paramagnetic particle, chemiluminescent  immunoassay for the quantitative determination of total prostate specific  antigen(PSA) levels using the Access  Immunoassay Systems. Values obtained  with different methods cannot be used interchangeably for patient  monitoring.   09/24/2014 3.32 0.00 - 4.00 ng/mL Final     Comment:     The Access Hybritech PSA assay is a paramagnetic particle, chemiluminescent  immunoassay for the quantitative determination of total prostate specific  antigen(PSA) levels using the Access Immunoassay Systems. Values obtained  with different methods cannot be used interchangeably for patient  monitoring.     PSA Total   Date Value Ref Range Status   03/16/2022 4.4 (H) 0.0 - 4.0 ng/mL Final     Comment:     INTERPRETIVE INFORMATION: Prostate Specific Antigen  The Roche PSA electrochemiluminescent immunoassay is used. Results  obtained with different test methods or kits cannot be used  interchangeably. The Roche PSA method is approved for use as an  aid in the detection of prostate cancer when used in conjunction  with a digital rectal exam in individuals with a prostate age 50  years and older. The Roche PSA is also indicated for the serial  measurement of PSA to aid in the prognosis and management of  prostate cancer patients. Elevated PSA concentrations can only  suggest the presence of prostate cancer until biopsy is performed.  PSA concentrations can also be elevated in benign prostatic  hyperplasia or inflammatory conditions of the prostate. PSA is  generally not elevated in healthy individuals or individuals with  nonprostatic carcinoma.     12/18/2020 5.8 (H) 0.0 - 4.0 ng/mL Final     Comment:     INTERPRETIVE INFORMATION: Prostate Specific Antigen  The Roche PSA electrochemiluminescent immunoassay was used.  Results obtained with different test methods or kits cannot be  used interchangeably. The Roche PSA method is approved for use as  an aid in the detection of prostate cancer when used in  conjunction with a digital rectal exam in men age 50 and older.  The Roche PSA method is also indicated for the serial measurement  of PSA to aid  in the prognosis and management of prostate cancer  patients. Elevated PSA concentrations can only suggest the  presence of prostate cancer until biopsy is performed. PSA  concentrations can also be elevated in benign prostatic  hyperplasia or inflammatory conditions of the prostate. PSA is  generally not elevated in healthy men or men with non-prostatic  carcinoma.       10/12/2024 7:31 AM     HISTORY/REASON FOR EXAM:  Elevated PSA     TECHNIQUE/EXAM DESCRIPTION:  MRI of the prostate without and with contrast.     The study was performed on a Caron 3.0 Tiarra MRI scanner.     Sagittal and axial T2; oblique high resolution axial and coronal T2; oblique axial diffusion-weighted imaging and ADC map with B values of 100, 400, and 1000; oblique axial dynamic postcontrast images of the prostate; postcontrast T1 fat-sat sequence of   the pelvis.     1 mg of glucagon was administered intravenously prior to the exam.     15 mL ProHance contrast was administered intravenously.     COMPARISON: None.     FINDINGS:  The prostate measures 5.8 x 4.5 x 5.6 cm with a volume of 76.5 cc.     Peripheral zone: No hemorrhage. Several wedge-shaped and linear areas of mild to moderate T2 hypointensity, consistent with sequela of prostatitis.     Transitional zone: Multiple stromal and glandular nodules of BPH.     Prostate lesions:  Lesion #1:  Location: Left peripheral midgland (PZpm)  Size: 1 cm on the T2-weighted images and 0.8 cm on the ADC map.  Capsular involvement: No extracapsular extension. No seminal vesicle invasion.  DWI/ADC score: 3  T2 score: 3  Enhancement: Equivocal  Total PI-RADS score: 3     Prostate capsule: Intact.  Seminal vesicles: Normal signal.     The bladder is normal in appearance.     No adenopathy is identified.     No suspicious bony lesions are identified.     Small fat-containing right inguinal hernia.     IMPRESSION:        1. A somewhat wedge-shaped lesion in the left peripheral mid gland, possibly  sequela of prostatitis PI-RADS 3. Consider follow-up.  2. No extracapsular disease.  3. BPH, with gland volume of 76.5 cc.  4. No pelvic lymphadenopathy or suspicious osseous lesions.     Total PI-RADS score: 3 - Intermediate (the presence of clinically significant cancer is equivocal)    Family History   Problem Relation Age of Onset    Hyperlipidemia Father     Cancer Father 99        Smoker    Hyperlipidemia Maternal Grandmother     Heart Disease Maternal Grandmother        Social History     Socioeconomic History    Marital status:    Tobacco Use    Smoking status: Never    Smokeless tobacco: Never    Tobacco comments:     None   Vaping Use    Vaping status: Never Used   Substance and Sexual Activity    Alcohol use: No     Comment: rare    Drug use: No    Sexual activity: Not Currently   Social History Narrative    ** Merged History Encounter **            Past Surgical History:   Procedure Laterality Date    INGUINAL HERNIA REPAIR ROBOTIC XI N/A 1/30/2025    Procedure: ROBOTIC BILATERAL  INGUINAL HERNIA REPAIR WITH MESH;  Surgeon: Reza Bailey M.D.;  Location: SURGERY [facility];  Service: Gen Robotic    HYDROCELECTOMY ADULT      NO PERTINENT PAST SURGICAL HISTORY  1975    Hydrocele    OTHER  2010    Turp    TURP-VAPOR N/A        Past Medical History:   Diagnosis Date    Acute nasopharyngitis Jan6    BPH 06/10/2009    High cholesterol 2019    Inguinal hernia 01/22/2025    Non-recurrent bilateral inguinal hernia without obstruction or gangrene 01/02/2025       Current Outpatient Medications   Medication Sig    albuterol 108 (90 Base) MCG/ACT Aero Soln inhalation aerosol Inhale 2 Puffs every four hours as needed for Shortness of Breath.    atorvastatin (LIPITOR) 20 MG Tab TAKE 1 TABLET BY MOUTH EVERY DAY (Patient taking differently: Take 20 mg by mouth every morning.)    ibuprofen (MOTRIN) 200 MG Tab Take 200 mg by mouth every 6 hours as needed.    Cholecalciferol (VITAMIN D) 50 MCG (2000 UT) Cap  Take  by mouth every day. Only takes during the winter season       No Known Allergies    Objective  There were no vitals taken for this visit.  Physical Exam      Labs:     POC UA  Lab Results   Component Value Date/Time    POCCOLOR Yellow 01/02/2025 04:43 PM    POCAPPEAR Clear 01/02/2025 04:43 PM    POCLEUKEST Negative 01/02/2025 04:43 PM    POCNITRITE Negative 01/02/2025 04:43 PM    POCUROBILIGE 0.2 01/02/2025 04:43 PM    POCPROTEIN Negative 01/02/2025 04:43 PM    POCURPH 6.5 01/02/2025 04:43 PM    POCBLOOD Negative 01/02/2025 04:43 PM    POCSPGRV 1.010 01/02/2025 04:43 PM    POCKETONES Negative 01/02/2025 04:43 PM    POCBILIRUBIN Negative 01/02/2025 04:43 PM    POCGLUCUA Negative 01/02/2025 04:43 PM        CMP  Lab Results   Component Value Date/Time    SODIUM 138 01/07/2025 1432    POTASSIUM 4.1 01/07/2025 1432    CHLORIDE 101 01/07/2025 1432    CO2 24 01/07/2025 1432    ANION 13.0 01/07/2025 1432    GLUCOSE 87 01/07/2025 1432    BUN 12 01/07/2025 1432    CREATININE 1.01 01/07/2025 1432    GFRCKD 80 01/07/2025 1432    CALCIUM 9.5 01/07/2025 1432    CORRCALC 9.2 08/02/2024 0754    ASTSGOT 16 08/02/2024 0754    ALTSGPT 15 08/02/2024 0754    ALKPHOSPHAT 55 08/02/2024 0754    TBILIRUBIN 0.5 08/02/2024 0754    ALBUMIN 4.4 08/02/2024 0754    TOTPROTEIN 7.4 08/02/2024 0754    GLOBULIN 3.0 08/02/2024 0754    AGRATIO 1.5 08/02/2024 0754       BMP  Lab Results   Component Value Date/Time    SODIUM 138 01/07/2025 1432    POTASSIUM 4.1 01/07/2025 1432    CHLORIDE 101 01/07/2025 1432    CO2 24 01/07/2025 1432    GLUCOSE 87 01/07/2025 1432    BUN 12 01/07/2025 1432    CREATININE 1.01 01/07/2025 1432    CALCIUM 9.5 01/07/2025 1432       CBC  Lab Results   Component Value Date/Time    WBC 6.8 01/27/2025 1446    RBC 4.45 (L) 01/27/2025 1446    HEMOGLOBIN 13.9 (L) 01/27/2025 1446    HEMATOCRIT 40.8 (L) 01/27/2025 1446    MCV 91.7 01/27/2025 1446    MCH 31.2 01/27/2025 1446    MCHC 34.1 01/27/2025 1446    RDW 41.1 01/27/2025 1446     MPV 11.0 01/27/2025 1446    LYMPHOCYTES 15.20 (L) 10/07/2024 0806    LYMPHS 1.12 10/07/2024 0806    MONOCYTES 6.70 10/07/2024 0806    MONOS 0.49 10/07/2024 0806    EOSINOPHILS 1.60 10/07/2024 0806    EOS 0.12 10/07/2024 0806    BASOPHILS 1.10 10/07/2024 0806    BASO 0.08 10/07/2024 0806    NRBC 0.00 10/07/2024 0806       A1C  Lab Results   Component Value Date/Time    HBA1C 5.3 03/16/2022 0712    AVGLUC 105 03/16/2022 0712       Imaging:   MRI PROSTATE   MR-RECTAL/PROSTATE PROTOCOL 10/12/2024    Narrative  10/12/2024 7:31 AM    HISTORY/REASON FOR EXAM:  Elevated PSA    TECHNIQUE/EXAM DESCRIPTION:  MRI of the prostate without and with contrast.    The study was performed on a Caron 3.0 Tiarra MRI scanner.    Sagittal and axial T2; oblique high resolution axial and coronal T2; oblique axial diffusion-weighted imaging and ADC map with B values of 100, 400, and 1000; oblique axial dynamic postcontrast images of the prostate; postcontrast T1 fat-sat sequence of  the pelvis.    1 mg of glucagon was administered intravenously prior to the exam.    15 mL ProHance contrast was administered intravenously.    COMPARISON: None.    FINDINGS:  The prostate measures 5.8 x 4.5 x 5.6 cm with a volume of 76.5 cc.    Peripheral zone: No hemorrhage. Several wedge-shaped and linear areas of mild to moderate T2 hypointensity, consistent with sequela of prostatitis.    Transitional zone: Multiple stromal and glandular nodules of BPH.    Prostate lesions:  Lesion #1:  Location: Left peripheral midgland (PZpm)  Size: 1 cm on the T2-weighted images and 0.8 cm on the ADC map.  Capsular involvement: No extracapsular extension. No seminal vesicle invasion.  DWI/ADC score: 3  T2 score: 3  Enhancement: Equivocal  Total PI-RADS score: 3    Prostate capsule: Intact.  Seminal vesicles: Normal signal.    The bladder is normal in appearance.    No adenopathy is identified.    No suspicious bony lesions are identified.    Small fat-containing right  inguinal hernia.    Impression  1. A somewhat wedge-shaped lesion in the left peripheral mid gland, possibly sequela of prostatitis PI-RADS 3. Consider follow-up.  2. No extracapsular disease.  3. BPH, with gland volume of 76.5 cc.  4. No pelvic lymphadenopathy or suspicious osseous lesions.    Total PI-RADS score: 3 - Intermediate (the presence of clinically significant cancer is equivocal)        Assessment & Plan    Elevated PSA.  His PSA remains stable and is off finasteride. PSAD 0.115. He had an MRI with only a PI-RADS 3 lesion in October 2024.  I recommend seeing him back in 1 year with a PSA test.      Jose Nieto M.D., F.A.C.S.  Urologic Oncology  Vice-Chair, Department of Surgery  [facility]

## 2025-04-09 DIAGNOSIS — R06.02 SOB (SHORTNESS OF BREATH): ICD-10-CM

## 2025-04-09 PROCEDURE — RXMED WILLOW AMBULATORY MEDICATION CHARGE: Performed by: FAMILY MEDICINE

## 2025-04-09 RX ORDER — ALBUTEROL SULFATE 90 UG/1
2 INHALANT RESPIRATORY (INHALATION) EVERY 4 HOURS PRN
Qty: 8.5 G | Refills: 3 | Status: SHIPPED | OUTPATIENT
Start: 2025-04-09

## 2025-04-09 RX ORDER — ALBUTEROL SULFATE 90 UG/1
2 INHALANT RESPIRATORY (INHALATION) EVERY 4 HOURS PRN
Qty: 8.5 G | Refills: 0 | Status: CANCELLED | OUTPATIENT
Start: 2025-04-09

## 2025-04-09 NOTE — TELEPHONE ENCOUNTER
Received request via: Pharmacy    Was the patient seen in the last year in this department? Yes    Does the patient have an active prescription (recently filled or refills available) for medication(s) requested? No    Pharmacy Name: [facility]    Does the patient have USP Plus and need 100-day supply? (This applies to ALL medications) Patient does not have SCP

## 2025-04-10 ENCOUNTER — PHARMACY VISIT (OUTPATIENT)
Dept: PHARMACY | Facility: MEDICAL CENTER | Age: 70
End: 2025-04-10
Payer: COMMERCIAL

## (undated) DEVICE — GLOVE BIOGEL INDICATOR SZ 8 SURGICAL PF LTX - (50/BX 4BX/CA)

## (undated) DEVICE — SUTURE GENERAL

## (undated) DEVICE — ELECTRODE DUAL RETURN W/ CORD - (50/PK)

## (undated) DEVICE — Device

## (undated) DEVICE — SEAL UNIVERSAL 5MM-12MM (10EA/BX)

## (undated) DEVICE — COVER LIGHT HANDLE ALC PLUS DISP (18EA/BX)

## (undated) DEVICE — SUTURE 4-0 MONOCRYL PLUS PS-2 - 27 INCH (36/BX)

## (undated) DEVICE — SUTURE 2-0 20CM STRATAFIX SPIRAL SH NEEDLE (12/BX)

## (undated) DEVICE — SET EXTENSION WITH 2 PORTS (48EA/CA) ***PART #2C8610 IS A SUBSTITUTE*****

## (undated) DEVICE — DRAPE ARM BOX OF 20

## (undated) DEVICE — DRAPE COLUMN BOX OF 20

## (undated) DEVICE — CHLORAPREP 26 ML APPLICATOR - ORANGE TINT(25/CA)

## (undated) DEVICE — GLOVE BIOGEL SZ 7 SURGICAL PF LTX - (50PR/BX 4BX/CA)

## (undated) DEVICE — TUBE NG SALEM SUMP 16FR (50EA/CA)

## (undated) DEVICE — COVER TIP ENDOWRIST HOT SHEAR - (10EA/BX) DA VINCI

## (undated) DEVICE — DERMABOND ADVANCED - (12EA/BX)

## (undated) DEVICE — TUBING CLEARLINK DUO-VENT - C-FLO (48EA/CA)

## (undated) DEVICE — GOWN WARMING STANDARD FLEX - (30/CA)

## (undated) DEVICE — SHEARS MONOPOLAR CURVED DA VINCI 10X'S REUSABLE

## (undated) DEVICE — SET LEADWIRE 5 LEAD BEDSIDE DISPOSABLE ECG (1SET OF 5/EA)

## (undated) DEVICE — LACTATED RINGERS INJ 1000 ML - (14EA/CA 60CA/PF)

## (undated) DEVICE — GLOVE SZ 6.5 BIOGEL PI MICRO - PF LF (50PR/BX)

## (undated) DEVICE — GLOVE SZ 7.5 BIOGEL PI MICRO - PF LF (50PR/BX)

## (undated) DEVICE — GOWN SURGEONS X-LARGE - DISP. (30/CA)

## (undated) DEVICE — GLOVE BIOGEL INDICATOR SZ 7SURGICAL PF LTX - (50/BX 4BX/CA)

## (undated) DEVICE — SLEEVE VASO DVT COMPRESSION CALF MED - (10PR/CA)

## (undated) DEVICE — BIPOLAR FORCE DA VINCI 12X'S REISABLE

## (undated) DEVICE — NEEDLE DRIVER MEGA SUTURECUT DA VINCI 15X'S REUSABLE

## (undated) DEVICE — OBTURATOR BLADELESS STANDARD 8MM (6EA/BX)

## (undated) DEVICE — WATER IRRIGATION STERILE 1000ML (12EA/CA)

## (undated) DEVICE — SYSTEM CLEARIFY VISUALIZATION (10EA/PK)

## (undated) DEVICE — SENSOR OXIMETER ADULT SPO2 RD SET (20EA/BX)

## (undated) DEVICE — CANISTER SUCTION 3000ML MECHANICAL FILTER AUTO SHUTOFF MEDI-VAC NONSTERILE LF DISP (40EA/CA)

## (undated) DEVICE — GLOVE BIOGEL PI INDICATOR SZ 6.5 SURGICAL PF LF - (50/BX 4BX/CA)